# Patient Record
Sex: MALE | Race: WHITE | NOT HISPANIC OR LATINO
[De-identification: names, ages, dates, MRNs, and addresses within clinical notes are randomized per-mention and may not be internally consistent; named-entity substitution may affect disease eponyms.]

---

## 2017-09-26 ENCOUNTER — RX RENEWAL (OUTPATIENT)
Age: 52
End: 2017-09-26

## 2017-09-26 PROBLEM — Z00.00 ENCOUNTER FOR PREVENTIVE HEALTH EXAMINATION: Status: ACTIVE | Noted: 2017-09-26

## 2017-10-23 ENCOUNTER — RX RENEWAL (OUTPATIENT)
Age: 52
End: 2017-10-23

## 2017-11-03 ENCOUNTER — RX RENEWAL (OUTPATIENT)
Age: 52
End: 2017-11-03

## 2017-11-14 ENCOUNTER — RX RENEWAL (OUTPATIENT)
Age: 52
End: 2017-11-14

## 2017-11-15 ENCOUNTER — RX RENEWAL (OUTPATIENT)
Age: 52
End: 2017-11-15

## 2017-11-20 ENCOUNTER — RX RENEWAL (OUTPATIENT)
Age: 52
End: 2017-11-20

## 2017-12-16 ENCOUNTER — RX RENEWAL (OUTPATIENT)
Age: 52
End: 2017-12-16

## 2018-01-22 ENCOUNTER — RX RENEWAL (OUTPATIENT)
Age: 53
End: 2018-01-22

## 2018-01-23 ENCOUNTER — RX RENEWAL (OUTPATIENT)
Age: 53
End: 2018-01-23

## 2018-01-23 RX ORDER — INSULIN GLARGINE 100 [IU]/ML
100 INJECTION, SOLUTION SUBCUTANEOUS DAILY
Qty: 1 | Refills: 1 | Status: DISCONTINUED | COMMUNITY
Start: 2018-01-23 | End: 2018-01-23

## 2018-04-16 ENCOUNTER — RX RENEWAL (OUTPATIENT)
Age: 53
End: 2018-04-16

## 2018-07-31 ENCOUNTER — RX RENEWAL (OUTPATIENT)
Age: 53
End: 2018-07-31

## 2018-08-01 ENCOUNTER — RX RENEWAL (OUTPATIENT)
Age: 53
End: 2018-08-01

## 2018-08-01 RX ORDER — BENAZEPRIL HYDROCHLORIDE 40 MG/1
40 TABLET, FILM COATED ORAL DAILY
Qty: 90 | Refills: 0 | Status: DISCONTINUED | COMMUNITY
Start: 2017-09-26 | End: 2018-08-01

## 2018-08-07 ENCOUNTER — RX RENEWAL (OUTPATIENT)
Age: 53
End: 2018-08-07

## 2018-08-07 ENCOUNTER — MEDICATION RENEWAL (OUTPATIENT)
Age: 53
End: 2018-08-07

## 2018-08-07 ENCOUNTER — APPOINTMENT (OUTPATIENT)
Dept: FAMILY MEDICINE | Facility: CLINIC | Age: 53
End: 2018-08-07

## 2018-08-07 RX ORDER — LISINOPRIL 40 MG/1
40 TABLET ORAL DAILY
Qty: 90 | Refills: 0 | Status: DISCONTINUED | COMMUNITY
Start: 2018-08-01 | End: 2018-08-07

## 2018-08-14 ENCOUNTER — APPOINTMENT (OUTPATIENT)
Dept: FAMILY MEDICINE | Facility: CLINIC | Age: 53
End: 2018-08-14
Payer: MEDICAID

## 2018-08-14 VITALS
SYSTOLIC BLOOD PRESSURE: 140 MMHG | OXYGEN SATURATION: 98 % | DIASTOLIC BLOOD PRESSURE: 80 MMHG | BODY MASS INDEX: 34.91 KG/M2 | HEIGHT: 74 IN | RESPIRATION RATE: 12 BRPM | WEIGHT: 272 LBS | HEART RATE: 77 BPM

## 2018-08-14 DIAGNOSIS — K21.9 GASTRO-ESOPHAGEAL REFLUX DISEASE W/OUT ESOPHAGITIS: ICD-10-CM

## 2018-08-14 PROCEDURE — 99213 OFFICE O/P EST LOW 20 MIN: CPT

## 2018-08-14 RX ORDER — CITALOPRAM HYDROBROMIDE 20 MG/1
20 TABLET, FILM COATED ORAL
Qty: 30 | Refills: 6 | Status: DISCONTINUED | COMMUNITY
Start: 2017-11-14 | End: 2018-08-14

## 2018-08-14 NOTE — ASSESSMENT
[FreeTextEntry1] : 1. HTN- I renewed his medications. Diet/exercise reviewed and stressed to lose wt.  2. GERD- reviewed diet and use of  omeprazole.  As seen by endocrine q 3 months - he should be seen here q 6 months.

## 2018-08-14 NOTE — HISTORY OF PRESENT ILLNESS
[FreeTextEntry1] : pt presents for med check [de-identified] : Pt here for management of HTN and GERD  Seen by Endocrine - [facility].

## 2018-08-14 NOTE — REVIEW OF SYSTEMS
[Joint Pain] : joint pain [Joint Stiffness] : joint stiffness [Anxiety] : anxiety [Depression] : depression [Negative] : Heme/Lymph

## 2018-08-14 NOTE — PHYSICAL EXAM
[No Acute Distress] : no acute distress [Well-Appearing] : well-appearing [No JVD] : no jugular venous distention [Supple] : supple [No Lymphadenopathy] : no lymphadenopathy [No Respiratory Distress] : no respiratory distress  [Clear to Auscultation] : lungs were clear to auscultation bilaterally [Normal Rate] : normal rate  [Regular Rhythm] : with a regular rhythm [No Murmur] : no murmur heard [No Carotid Bruits] : no carotid bruits [Normal Posterior Cervical Nodes] : no posterior cervical lymphadenopathy [Normal Anterior Cervical Nodes] : no anterior cervical lymphadenopathy [Speech Grossly Normal] : speech grossly normal [Memory Grossly Normal] : memory grossly normal [Normal Affect] : the affect was normal [Alert and Oriented x3] : oriented to person, place, and time [Normal Mood] : the mood was normal [Normal Insight/Judgement] : insight and judgment were intact [de-identified] : overweight

## 2018-10-01 ENCOUNTER — RX RENEWAL (OUTPATIENT)
Age: 53
End: 2018-10-01

## 2018-10-02 ENCOUNTER — RX RENEWAL (OUTPATIENT)
Age: 53
End: 2018-10-02

## 2018-10-12 ENCOUNTER — RX RENEWAL (OUTPATIENT)
Age: 53
End: 2018-10-12

## 2018-11-12 ENCOUNTER — APPOINTMENT (OUTPATIENT)
Dept: FAMILY MEDICINE | Facility: CLINIC | Age: 53
End: 2018-11-12
Payer: MEDICAID

## 2018-11-12 VITALS
DIASTOLIC BLOOD PRESSURE: 88 MMHG | BODY MASS INDEX: 34.14 KG/M2 | SYSTOLIC BLOOD PRESSURE: 140 MMHG | HEART RATE: 70 BPM | RESPIRATION RATE: 14 BRPM | WEIGHT: 266 LBS | HEIGHT: 74 IN | OXYGEN SATURATION: 98 %

## 2018-11-12 PROCEDURE — 99213 OFFICE O/P EST LOW 20 MIN: CPT

## 2018-11-12 NOTE — REVIEW OF SYSTEMS
[Vision Problems] : no vision problems [Postnasal Drip] : postnasal drip [Nasal Discharge] : nasal discharge [Negative] : Heme/Lymph [FreeTextEntry4] : left facial pain

## 2018-11-12 NOTE — PHYSICAL EXAM
[Normal Outer Ear/Nose] : the outer ears and nose were normal in appearance [Normal Oropharynx] : the oropharynx was normal [Normal TMs] : both tympanic membranes were normal [No JVD] : no jugular venous distention [Supple] : supple [No Lymphadenopathy] : no lymphadenopathy [No Respiratory Distress] : no respiratory distress  [Clear to Auscultation] : lungs were clear to auscultation bilaterally [Normal Rate] : normal rate  [Regular Rhythm] : with a regular rhythm [No Murmur] : no murmur heard [Normal Posterior Cervical Nodes] : no posterior cervical lymphadenopathy [Normal Anterior Cervical Nodes] : no anterior cervical lymphadenopathy [de-identified] : in distress with pain, obese [de-identified] : very tender in left frontal area, PND

## 2018-11-12 NOTE — ASSESSMENT
[FreeTextEntry1] : I reviewed the CT of the brain result from [facility]- Does have sinus changes in left maxillary and B/L ethmoid sinuses. Very tender with percussion over left frontal. For treatment of sinus infection - rx cefuroxime 500 mg 1 bid pc, use plain mucinex and drink a lot of water. Can use tylenol 500 mg 1-2 q 6 h prn.

## 2018-11-12 NOTE — HISTORY OF PRESENT ILLNESS
[FreeTextEntry1] : Patient presents for ER follow-up\par \par From [facility]\par  [de-identified] : Pt having pain about left eye/lower left forehead. Sharp pain, present about 1 week. Has never had pain like this before. He was seen at [facility] yesterday. No visual changes, was prescribed fioricet - doesn't do anything

## 2018-11-19 ENCOUNTER — APPOINTMENT (OUTPATIENT)
Dept: FAMILY MEDICINE | Facility: CLINIC | Age: 53
End: 2018-11-19
Payer: MEDICAID

## 2018-11-19 VITALS
HEART RATE: 72 BPM | OXYGEN SATURATION: 97 % | SYSTOLIC BLOOD PRESSURE: 116 MMHG | DIASTOLIC BLOOD PRESSURE: 80 MMHG | RESPIRATION RATE: 12 BRPM

## 2018-11-19 DIAGNOSIS — Z87.09 PERSONAL HISTORY OF OTHER DISEASES OF THE RESPIRATORY SYSTEM: ICD-10-CM

## 2018-11-19 PROCEDURE — 99213 OFFICE O/P EST LOW 20 MIN: CPT

## 2018-11-19 NOTE — HISTORY OF PRESENT ILLNESS
[FreeTextEntry8] : pt presents for re check \par pt c/o ha +sinus pressure +vomit  x since last visit

## 2018-11-19 NOTE — REVIEW OF SYSTEMS
[Postnasal Drip] : postnasal drip [Nasal Discharge] : nasal discharge [Negative] : Heme/Lymph [FreeTextEntry4] : left frontal pressure/pain

## 2018-11-19 NOTE — PHYSICAL EXAM
[No Acute Distress] : no acute distress [Ill-Appearing] : ill-appearing [Normal Outer Ear/Nose] : the outer ears and nose were normal in appearance [Normal Oropharynx] : the oropharynx was normal [Normal TMs] : both tympanic membranes were normal [No JVD] : no jugular venous distention [Supple] : supple [No Lymphadenopathy] : no lymphadenopathy [No Respiratory Distress] : no respiratory distress  [Clear to Auscultation] : lungs were clear to auscultation bilaterally [Normal Rate] : normal rate  [Regular Rhythm] : with a regular rhythm [No Murmur] : no murmur heard [No Carotid Bruits] : no carotid bruits [Normal Posterior Cervical Nodes] : no posterior cervical lymphadenopathy [Normal Anterior Cervical Nodes] : no anterior cervical lymphadenopathy [de-identified] : overweight [de-identified] : left frontal tenderness

## 2018-11-19 NOTE — ASSESSMENT
[FreeTextEntry1] : Sinusitis not improving on cefuroxime. He is on citalopram and quinalones would not be a good choice.  Having some nausea and he has had issues with augmnentin and biaxin in the past.  Rx for suprax 400 mg 1 /day.  BRAT diet reviewed and he has zofran at home.

## 2019-03-14 ENCOUNTER — APPOINTMENT (OUTPATIENT)
Dept: FAMILY MEDICINE | Facility: CLINIC | Age: 54
End: 2019-03-14

## 2019-03-19 ENCOUNTER — RX RENEWAL (OUTPATIENT)
Age: 54
End: 2019-03-19

## 2019-04-24 ENCOUNTER — RX RENEWAL (OUTPATIENT)
Age: 54
End: 2019-04-24

## 2019-06-10 ENCOUNTER — RX RENEWAL (OUTPATIENT)
Age: 54
End: 2019-06-10

## 2019-07-18 ENCOUNTER — RX RENEWAL (OUTPATIENT)
Age: 54
End: 2019-07-18

## 2019-08-07 ENCOUNTER — RX RENEWAL (OUTPATIENT)
Age: 54
End: 2019-08-07

## 2019-08-08 ENCOUNTER — RX RENEWAL (OUTPATIENT)
Age: 54
End: 2019-08-08

## 2019-11-20 ENCOUNTER — RX RENEWAL (OUTPATIENT)
Age: 54
End: 2019-11-20

## 2020-01-28 ENCOUNTER — RX RENEWAL (OUTPATIENT)
Age: 55
End: 2020-01-28

## 2020-03-03 ENCOUNTER — RX RENEWAL (OUTPATIENT)
Age: 55
End: 2020-03-03

## 2020-03-17 ENCOUNTER — APPOINTMENT (OUTPATIENT)
Dept: FAMILY MEDICINE | Facility: CLINIC | Age: 55
End: 2020-03-17
Payer: COMMERCIAL

## 2020-03-17 VITALS
OXYGEN SATURATION: 97 % | RESPIRATION RATE: 14 BRPM | SYSTOLIC BLOOD PRESSURE: 136 MMHG | HEIGHT: 74 IN | HEART RATE: 60 BPM | WEIGHT: 252 LBS | DIASTOLIC BLOOD PRESSURE: 76 MMHG | BODY MASS INDEX: 32.34 KG/M2 | TEMPERATURE: 98.3 F

## 2020-03-17 LAB — ESTIMATED AVERAGE GLUCOSE: 11.5

## 2020-03-17 PROCEDURE — 99214 OFFICE O/P EST MOD 30 MIN: CPT | Mod: 25

## 2020-03-17 PROCEDURE — 90674 CCIIV4 VAC NO PRSV 0.5 ML IM: CPT

## 2020-03-17 PROCEDURE — 90732 PPSV23 VACC 2 YRS+ SUBQ/IM: CPT

## 2020-03-17 PROCEDURE — 90472 IMMUNIZATION ADMIN EACH ADD: CPT

## 2020-03-17 PROCEDURE — G0009: CPT

## 2020-03-17 RX ORDER — INSULIN LISPRO 100 [IU]/ML
100 INJECTION, SOLUTION INTRAVENOUS; SUBCUTANEOUS
Refills: 0 | Status: DISCONTINUED | COMMUNITY
End: 2020-03-17

## 2020-03-17 RX ORDER — CEFUROXIME AXETIL 500 MG/1
500 TABLET ORAL
Qty: 20 | Refills: 0 | Status: DISCONTINUED | COMMUNITY
Start: 2018-11-12 | End: 2020-03-17

## 2020-03-17 RX ORDER — INSULIN GLARGINE 100 [IU]/ML
100 INJECTION, SOLUTION SUBCUTANEOUS DAILY
Qty: 1 | Refills: 2 | Status: DISCONTINUED | COMMUNITY
Start: 2018-01-23 | End: 2020-03-17

## 2020-03-17 RX ORDER — CEFIXIME 400 MG/1
400 CAPSULE ORAL
Qty: 10 | Refills: 0 | Status: DISCONTINUED | COMMUNITY
Start: 2018-11-19 | End: 2020-03-17

## 2020-03-17 RX ORDER — METFORMIN HYDROCHLORIDE 500 MG/1
500 TABLET, COATED ORAL
Qty: 180 | Refills: 1 | Status: DISCONTINUED | COMMUNITY
End: 2020-03-17

## 2020-03-17 NOTE — HEALTH RISK ASSESSMENT
[Patient reported colonoscopy was normal] : Patient reported colonoscopy was normal [HIV test declined] : HIV test declined [Hepatitis C test declined] : Hepatitis C test declined [ColonoscopyDate] : 2012 [ColonoscopyComments] : told to repeat in 5yrs Dr Fernando jenkins [facility]

## 2020-03-17 NOTE — PHYSICAL EXAM
[No Lymphadenopathy] : no lymphadenopathy [Supple] : supple [No Edema] : there was no peripheral edema [Normal Affect] : the affect was normal [Normal Mood] : the mood was normal [Normal Insight/Judgement] : insight and judgment were intact [None] : no ulcers in either foot were found [] : right foot [___] : left foot points [unfilled] [Normal] : soft, non-tender, non-distended, no masses palpated, no HSM and normal bowel sounds [de-identified] : Full ROM of back b/l, mild discomfort in right paraspinal lumbar region w/ flexion, extension and  lateral rotation, no pain on palpatio of spine, +pain on palpation of lumbar paraspinal region, no edema or erythema or warmth or weakness noted  [de-identified] : +straight leg raise right leg [de-identified] : thickening and yellowing of toe nails b/l feet \par dry skin noted  [TWNoteComboBox3] : +2 [TWNoteComboBox4] : +2

## 2020-03-17 NOTE — REVIEW OF SYSTEMS
[Back Pain] : back pain [Negative] : Cardiovascular [Fever] : no fever [Chills] : no chills [Vision Problems] : no vision problems [Headache] : no headache

## 2020-03-17 NOTE — PLAN
[FreeTextEntry1] : \par HTN \par recheck 3-4 weeks\par avoid salt\par avoid nsaids\par refilled meds\par renal panel being checked by endo advised to have labs sent to office\par \par due for colonoscopy, referral given\par \par flu vaccine given right arm IM\par no adverse reactions noted, consent obtained\par \par pneumovax 23 vaccine given left arm IM\par no adverse reactions noted, consent obtained\par \par \par DM II\par retinal exam was done 6mos ago will obtain result\par seen by endocrine yesterday and a1c beingchecked at lab\par advised to see podiatrist\par advised to check feet nightly\par moisturizing cream advised to feet \par \par anxiety/depression controlled\par Denies suicidal or homicidal ideations\par refilled meds\par \par \par f/u 3-4 weeks for bp check pt agreed w/plan and will follow up\par advised to stay inside as he has Diabetes and is at risk of getting complications with coronavirus if he contracts thevirus\par he agreed and is working from home\par

## 2020-03-17 NOTE — HISTORY OF PRESENT ILLNESS
[FreeTextEntry1] : patient presents for medication renewal\par  [de-identified] : 55yo male presents for medication renewal\par he is feeling well overall except for low back pain and leg pain at times \par \par he went to [facility] on [address] [place] for diabetic retinal exam and it was normal\par he hasn’t seen a podiatrist\par \par he goes to [facility] and was given blood work script  yesterday\par \par yesterday his bp at endocrine was 117/70\par \par he said if he is driving or laying down he has pain shooting down his right and sometimes left leg, burning and tightening\par he said he had cramps in his ankle last night\par he is having potassium checked by endocrinologist \par he started a new job and he is on his feet 8-9hrs perday\par he has lower back pain also \par \par denie swelling in legs, warmth or redness \par \par c/o burning, tightening leg/ back   pain,   7 /10 intermittent alleviated by advil  , aggravated by   laying down or sitting for too long \par \par he is taking advil 3x per day\par \par denies saddle paresthesia or loss of control of bowels or bladder \par \par anxiety/depression controlled\par Denies suicidal or homicidal ideations

## 2020-04-28 ENCOUNTER — APPOINTMENT (OUTPATIENT)
Dept: FAMILY MEDICINE | Facility: CLINIC | Age: 55
End: 2020-04-28

## 2020-08-15 ENCOUNTER — RX RENEWAL (OUTPATIENT)
Age: 55
End: 2020-08-15

## 2020-09-04 ENCOUNTER — APPOINTMENT (OUTPATIENT)
Dept: FAMILY MEDICINE | Facility: CLINIC | Age: 55
End: 2020-09-04
Payer: COMMERCIAL

## 2020-09-04 VITALS
HEIGHT: 74 IN | WEIGHT: 248 LBS | OXYGEN SATURATION: 96 % | BODY MASS INDEX: 31.83 KG/M2 | HEART RATE: 97 BPM | DIASTOLIC BLOOD PRESSURE: 78 MMHG | RESPIRATION RATE: 14 BRPM | SYSTOLIC BLOOD PRESSURE: 136 MMHG

## 2020-09-04 VITALS — TEMPERATURE: 95.6 F

## 2020-09-04 DIAGNOSIS — R22.1 LOCALIZED SWELLING, MASS AND LUMP, NECK: ICD-10-CM

## 2020-09-04 PROCEDURE — 99214 OFFICE O/P EST MOD 30 MIN: CPT

## 2020-09-06 ENCOUNTER — RX RENEWAL (OUTPATIENT)
Age: 55
End: 2020-09-06

## 2020-09-08 NOTE — PLAN
[FreeTextEntry1] : \par \par preop\par labsreceived and wnl \par ekg received \par -EKG- NSR @78   BPM, NORMAL AXIS, NORMAL RI/QT INTERVAL, NO ST/ T WAVE ABNORMALITIES NOTED\par EKG reviewed\par no nsaids or asa 7 days prior to surgery\par \par Enlarged neck\par -US neck doesn’t need this prior to surgery\par -Tsh with reflex free t4 if abnormal US\par \par pt agreed w/plan \par advised if patient doesn’t hear from me 1 week after testing to call office for results , patient agreed w/plan and understood.\par \par \par PT OPTIMIZED FOR SURGERY

## 2020-09-08 NOTE — PHYSICAL EXAM
[No Lymphadenopathy] : no lymphadenopathy [Supple] : supple [No CVA Tenderness] : no CVA  tenderness [No Edema] : there was no peripheral edema [Normal] : soft, non-tender, non-distended, no masses palpated, no HSM and normal bowel sounds [Normal Affect] : the affect was normal [No Focal Deficits] : no focal deficits [Normal Mood] : the mood was normal [Normal Insight/Judgement] : insight and judgment were intact [de-identified] : thyroid feels enlarged [de-identified] : CN 2-12 INTACT, NORMAL STRENGTH UPPER AND LOWER EXT B/L 5/5, NORMAL RAPID ALTERNATING MOVEMENTS AND FINGER TO NOSE

## 2020-09-08 NOTE — HISTORY OF PRESENT ILLNESS
[No Pertinent Cardiac History] : no history of aortic stenosis, atrial fibrillation, coronary artery disease, recent myocardial infarction, or implantable device/pacemaker [No Adverse Anesthesia Reaction] : no adverse anesthesia reaction in self or family member [No Pertinent Pulmonary History] : no history of asthma, COPD, sleep apnea, or smoking [Diabetes] : diabetes [(Patient denies any chest pain, claudication, dyspnea on exertion, orthopnea, palpitations or syncope)] : Patient denies any chest pain, claudication, dyspnea on exertion, orthopnea, palpitations or syncope [Moderate (4-6 METs)] : Moderate (4-6 METs) [Family Member] : no family member with adverse anesthesia reaction/sudden death [Self] : no previous adverse anesthesia reaction [FreeTextEntry1] : right hip surgery [Chronic Anticoagulation] : no chronic anticoagulation [Chronic Kidney Disease] : no chronic kidney disease [FreeTextEntry4] : Patient presents for medical clearance for right hip replacement \par  [FreeTextEntry2] : 9/9/2020 [FreeTextEntry3] : Dr. River

## 2020-09-08 NOTE — REVIEW OF SYSTEMS
[Negative] : Heme/Lymph [Dysuria] : no dysuria [Frequency] : no frequency [Hematuria] : no hematuria [Skin Rash] : no skin rash

## 2020-09-22 ENCOUNTER — RX RENEWAL (OUTPATIENT)
Age: 55
End: 2020-09-22

## 2020-09-27 ENCOUNTER — RX RENEWAL (OUTPATIENT)
Age: 55
End: 2020-09-27

## 2020-10-17 ENCOUNTER — RX RENEWAL (OUTPATIENT)
Age: 55
End: 2020-10-17

## 2020-12-05 ENCOUNTER — RX RENEWAL (OUTPATIENT)
Age: 55
End: 2020-12-05

## 2020-12-16 PROBLEM — Z87.09 HISTORY OF ACUTE SINUSITIS: Status: RESOLVED | Noted: 2018-11-12 | Resolved: 2020-12-16

## 2021-01-20 ENCOUNTER — TRANSCRIPTION ENCOUNTER (OUTPATIENT)
Age: 56
End: 2021-01-20

## 2021-01-21 ENCOUNTER — APPOINTMENT (OUTPATIENT)
Dept: FAMILY MEDICINE | Facility: CLINIC | Age: 56
End: 2021-01-21

## 2021-01-21 ENCOUNTER — RX RENEWAL (OUTPATIENT)
Age: 56
End: 2021-01-21

## 2021-01-22 ENCOUNTER — RX RENEWAL (OUTPATIENT)
Age: 56
End: 2021-01-22

## 2021-01-24 ENCOUNTER — RX RENEWAL (OUTPATIENT)
Age: 56
End: 2021-01-24

## 2021-02-11 ENCOUNTER — RX RENEWAL (OUTPATIENT)
Age: 56
End: 2021-02-11

## 2021-02-17 ENCOUNTER — RX RENEWAL (OUTPATIENT)
Age: 56
End: 2021-02-17

## 2021-03-05 ENCOUNTER — APPOINTMENT (OUTPATIENT)
Dept: FAMILY MEDICINE | Facility: CLINIC | Age: 56
End: 2021-03-05

## 2021-03-29 ENCOUNTER — APPOINTMENT (OUTPATIENT)
Dept: FAMILY MEDICINE | Facility: CLINIC | Age: 56
End: 2021-03-29

## 2021-04-02 ENCOUNTER — APPOINTMENT (OUTPATIENT)
Dept: FAMILY MEDICINE | Facility: CLINIC | Age: 56
End: 2021-04-02
Payer: COMMERCIAL

## 2021-04-02 VITALS
SYSTOLIC BLOOD PRESSURE: 152 MMHG | OXYGEN SATURATION: 98 % | TEMPERATURE: 97.2 F | RESPIRATION RATE: 14 BRPM | DIASTOLIC BLOOD PRESSURE: 80 MMHG | HEIGHT: 74 IN | HEART RATE: 66 BPM | BODY MASS INDEX: 31.83 KG/M2 | WEIGHT: 248 LBS

## 2021-04-02 VITALS — DIASTOLIC BLOOD PRESSURE: 80 MMHG | SYSTOLIC BLOOD PRESSURE: 150 MMHG

## 2021-04-02 DIAGNOSIS — Z12.11 ENCOUNTER FOR SCREENING FOR MALIGNANT NEOPLASM OF COLON: ICD-10-CM

## 2021-04-02 DIAGNOSIS — L60.8 OTHER NAIL DISORDERS: ICD-10-CM

## 2021-04-02 DIAGNOSIS — Z92.29 PERSONAL HISTORY OF OTHER DRUG THERAPY: ICD-10-CM

## 2021-04-02 LAB — HBA1C MFR BLD HPLC: 7.8

## 2021-04-02 PROCEDURE — 99072 ADDL SUPL MATRL&STAF TM PHE: CPT

## 2021-04-02 PROCEDURE — 99214 OFFICE O/P EST MOD 30 MIN: CPT

## 2021-04-02 RX ORDER — DAPAGLIFLOZIN AND METFORMIN HYDROCHLORIDE 5; 1000 MG/1; MG/1
5-1000 TABLET, FILM COATED, EXTENDED RELEASE ORAL DAILY
Refills: 0 | Status: DISCONTINUED | COMMUNITY
Start: 2020-03-17 | End: 2021-04-02

## 2021-04-02 NOTE — REVIEW OF SYSTEMS
[Chest Pain] : chest pain [Back Pain] : back pain [Skin Rash] : no skin rash [Negative] : Heme/Lymph [de-identified] : discolored toenail

## 2021-04-02 NOTE — HISTORY OF PRESENT ILLNESS
[FreeTextEntry1] : patient presents for hospital follow up  [de-identified] : Two nights ago he started sweating and had pain in his chest and back.  The pain resolved but woke him up yesterday morning and he was seen at the ER.  Lab testing was negative for a heart attack and the EKG was normal.  Xrays were negative.  The pain is constant and is stronger.  The pain is like a stabbing and burning in the left side of his chest and the left mid back.  He took motrin and applied ice and heat without improvement. He had chicken pox when he was 29 years old.  He also has a discolored left big toenail

## 2021-04-02 NOTE — PLAN
[FreeTextEntry1] : although he hasn't developed a rash yet his symptoms are very consistent with zoster, the CBC, CMP, troponin and EKG were reviewed via [facility], he will take gabapentin and valtrex as directed, he was advised to call me in the next few days with follow up  and to see a podiatrist for the toenail discoloration and regularly due to diabetes

## 2021-04-02 NOTE — PHYSICAL EXAM
[No Acute Distress] : no acute distress [Well Nourished] : well nourished [Well Developed] : well developed [Well-Appearing] : well-appearing [Normal Voice/Communication] : normal voice/communication [Supple] : supple [No Respiratory Distress] : no respiratory distress  [No Accessory Muscle Use] : no accessory muscle use [Clear to Auscultation] : lungs were clear to auscultation bilaterally [Normal Rate] : normal rate  [Regular Rhythm] : with a regular rhythm [Normal S1, S2] : normal S1 and S2 [No Murmur] : no murmur heard [No Rash] : no rash [Normal Mood] : the mood was normal [de-identified] : black discoloration of left 1st toenail

## 2021-04-05 ENCOUNTER — NON-APPOINTMENT (OUTPATIENT)
Age: 56
End: 2021-04-05

## 2021-04-05 RX ORDER — GABAPENTIN 300 MG/1
300 CAPSULE ORAL
Qty: 33 | Refills: 0 | Status: DISCONTINUED | COMMUNITY
Start: 2021-04-02 | End: 2021-04-05

## 2021-04-08 ENCOUNTER — RX RENEWAL (OUTPATIENT)
Age: 56
End: 2021-04-08

## 2021-04-16 ENCOUNTER — RX RENEWAL (OUTPATIENT)
Age: 56
End: 2021-04-16

## 2021-04-26 ENCOUNTER — RX RENEWAL (OUTPATIENT)
Age: 56
End: 2021-04-26

## 2021-05-18 ENCOUNTER — RX RENEWAL (OUTPATIENT)
Age: 56
End: 2021-05-18

## 2021-05-26 ENCOUNTER — APPOINTMENT (OUTPATIENT)
Dept: NEUROLOGY | Facility: CLINIC | Age: 56
End: 2021-05-26

## 2021-06-19 ENCOUNTER — RX RENEWAL (OUTPATIENT)
Age: 56
End: 2021-06-19

## 2021-07-09 ENCOUNTER — RX CHANGE (OUTPATIENT)
Age: 56
End: 2021-07-09

## 2021-07-14 ENCOUNTER — RX CHANGE (OUTPATIENT)
Age: 56
End: 2021-07-14

## 2021-08-09 ENCOUNTER — NON-APPOINTMENT (OUTPATIENT)
Age: 56
End: 2021-08-09

## 2021-10-09 ENCOUNTER — RX RENEWAL (OUTPATIENT)
Age: 56
End: 2021-10-09

## 2021-10-12 ENCOUNTER — APPOINTMENT (OUTPATIENT)
Dept: FAMILY MEDICINE | Facility: CLINIC | Age: 56
End: 2021-10-12
Payer: COMMERCIAL

## 2021-10-12 VITALS
TEMPERATURE: 97.6 F | SYSTOLIC BLOOD PRESSURE: 122 MMHG | RESPIRATION RATE: 14 BRPM | BODY MASS INDEX: 30.93 KG/M2 | HEIGHT: 74 IN | DIASTOLIC BLOOD PRESSURE: 82 MMHG | WEIGHT: 241 LBS | HEART RATE: 80 BPM | OXYGEN SATURATION: 98 %

## 2021-10-12 DIAGNOSIS — Z23 ENCOUNTER FOR IMMUNIZATION: ICD-10-CM

## 2021-10-12 DIAGNOSIS — B02.9 ZOSTER W/OUT COMPLICATIONS: ICD-10-CM

## 2021-10-12 DIAGNOSIS — M79.2 NEURALGIA AND NEURITIS, UNSPECIFIED: ICD-10-CM

## 2021-10-12 LAB — HBA1C MFR BLD HPLC: 7.3

## 2021-10-12 PROCEDURE — 99214 OFFICE O/P EST MOD 30 MIN: CPT

## 2021-10-12 RX ORDER — GABAPENTIN 400 MG/1
400 CAPSULE ORAL EVERY 6 HOURS
Qty: 40 | Refills: 0 | Status: DISCONTINUED | COMMUNITY
Start: 2021-04-05 | End: 2021-10-12

## 2021-10-12 RX ORDER — VALACYCLOVIR 1 G/1
1 TABLET, FILM COATED ORAL
Qty: 21 | Refills: 0 | Status: DISCONTINUED | COMMUNITY
Start: 2021-04-02 | End: 2021-10-12

## 2021-10-12 NOTE — PHYSICAL EXAM
[No Acute Distress] : no acute distress [Well Nourished] : well nourished [Well Developed] : well developed [Well-Appearing] : well-appearing [Normal Voice/Communication] : normal voice/communication [No Respiratory Distress] : no respiratory distress  [No Accessory Muscle Use] : no accessory muscle use [Clear to Auscultation] : lungs were clear to auscultation bilaterally [Normal Rate] : normal rate  [Regular Rhythm] : with a regular rhythm [Normal S1, S2] : normal S1 and S2 [Soft] : abdomen soft [Non Tender] : non-tender [Non-distended] : non-distended [Normal Bowel Sounds] : normal bowel sounds [Coordination Grossly Intact] : coordination grossly intact [Normal Mood] : the mood was normal

## 2021-10-12 NOTE — HISTORY OF PRESENT ILLNESS
[FreeTextEntry8] : Patient presents for heart burn/pain, diarrhea x 1 week.  About a week ago he started with a pressure in his upper abdomen with heartburn and now after eating anything he has watery stool.  He hasn't had a fever, vomiting but does feel nauseous.  The stool is light brown in color.  He hasn't been on any antibiotic tx.  He hasn't taken any OTC tx and is taking the omeprazole daily.  He has difficulty swallowing large pills.  He had lab testing at [facility] about a month ago.

## 2021-10-12 NOTE — PLAN
[FreeTextEntry1] : he was advised to stop eating dairy products, to continue omeprazole and to take sucralfate before each meal and at HS, GI consultation was advised, abdominal ultrasound ordered, amylase and lipase ordered and will be drawn in the offide and sent to [facility] for analysis, ophthalmology referral for diabetic eye exam ordered and we will obtain a copy of his latest lab report from endo

## 2021-10-12 NOTE — REVIEW OF SYSTEMS
[Abdominal Pain] : abdominal pain [Nausea] : nausea [Constipation] : no constipation [Diarrhea] : diarrhea [Vomiting] : no vomiting [Heartburn] : heartburn [Melena] : no melena [Negative] : Heme/Lymph

## 2021-10-13 LAB
AMYLASE/CREAT SERPL: 49 U/L
LPL SERPL-CCNC: 19 U/L

## 2021-10-14 ENCOUNTER — APPOINTMENT (OUTPATIENT)
Dept: FAMILY MEDICINE | Facility: CLINIC | Age: 56
End: 2021-10-14

## 2021-10-18 ENCOUNTER — APPOINTMENT (OUTPATIENT)
Dept: ULTRASOUND IMAGING | Facility: CLINIC | Age: 56
End: 2021-10-18
Payer: COMMERCIAL

## 2021-10-18 ENCOUNTER — OUTPATIENT (OUTPATIENT)
Dept: OUTPATIENT SERVICES | Facility: HOSPITAL | Age: 56
LOS: 1 days | End: 2021-10-18
Payer: COMMERCIAL

## 2021-10-18 DIAGNOSIS — R10.13 EPIGASTRIC PAIN: ICD-10-CM

## 2021-10-18 PROCEDURE — 76700 US EXAM ABDOM COMPLETE: CPT

## 2021-10-18 PROCEDURE — 76700 US EXAM ABDOM COMPLETE: CPT | Mod: 26

## 2021-10-29 ENCOUNTER — NON-APPOINTMENT (OUTPATIENT)
Age: 56
End: 2021-10-29

## 2021-10-31 ENCOUNTER — RX RENEWAL (OUTPATIENT)
Age: 56
End: 2021-10-31

## 2021-11-15 ENCOUNTER — APPOINTMENT (OUTPATIENT)
Dept: OPHTHALMOLOGY | Facility: CLINIC | Age: 56
End: 2021-11-15

## 2021-12-07 ENCOUNTER — RX RENEWAL (OUTPATIENT)
Age: 56
End: 2021-12-07

## 2021-12-22 ENCOUNTER — RX RENEWAL (OUTPATIENT)
Age: 56
End: 2021-12-22

## 2022-02-17 DIAGNOSIS — K55.069 ACUTE INFARCTION OF INTESTINE, PART AND EXTENT UNSPECIFIED: ICD-10-CM

## 2022-02-27 ENCOUNTER — RX RENEWAL (OUTPATIENT)
Age: 57
End: 2022-02-27

## 2022-03-27 ENCOUNTER — RX RENEWAL (OUTPATIENT)
Age: 57
End: 2022-03-27

## 2022-04-04 DIAGNOSIS — R91.1 SOLITARY PULMONARY NODULE: ICD-10-CM

## 2022-04-11 ENCOUNTER — RX RENEWAL (OUTPATIENT)
Age: 57
End: 2022-04-11

## 2022-05-18 DIAGNOSIS — I48.0 PAROXYSMAL ATRIAL FIBRILLATION: ICD-10-CM

## 2022-05-18 RX ORDER — SUCRALFATE 1 G/1
1 TABLET ORAL
Qty: 120 | Refills: 11 | Status: DISCONTINUED | COMMUNITY
Start: 2021-10-12 | End: 2022-05-18

## 2022-05-18 RX ORDER — OMEPRAZOLE 40 MG/1
40 CAPSULE, DELAYED RELEASE ORAL
Qty: 90 | Refills: 1 | Status: DISCONTINUED | COMMUNITY
Start: 2017-12-16 | End: 2022-05-18

## 2022-05-18 RX ORDER — BENAZEPRIL HYDROCHLORIDE 40 MG/1
40 TABLET, FILM COATED ORAL
Qty: 90 | Refills: 0 | Status: DISCONTINUED | COMMUNITY
Start: 2018-08-07 | End: 2022-05-18

## 2022-06-10 ENCOUNTER — RX CHANGE (OUTPATIENT)
Age: 57
End: 2022-06-10

## 2022-06-21 ENCOUNTER — RX RENEWAL (OUTPATIENT)
Age: 57
End: 2022-06-21

## 2022-06-30 ENCOUNTER — RX RENEWAL (OUTPATIENT)
Age: 57
End: 2022-06-30

## 2022-07-05 ENCOUNTER — APPOINTMENT (OUTPATIENT)
Dept: FAMILY MEDICINE | Facility: CLINIC | Age: 57
End: 2022-07-05

## 2022-07-05 VITALS
HEART RATE: 82 BPM | WEIGHT: 211 LBS | SYSTOLIC BLOOD PRESSURE: 114 MMHG | RESPIRATION RATE: 14 BRPM | OXYGEN SATURATION: 97 % | HEIGHT: 74 IN | TEMPERATURE: 98.3 F | DIASTOLIC BLOOD PRESSURE: 60 MMHG | BODY MASS INDEX: 27.08 KG/M2

## 2022-07-05 DIAGNOSIS — R10.13 EPIGASTRIC PAIN: ICD-10-CM

## 2022-07-05 DIAGNOSIS — Z11.59 ENCOUNTER FOR SCREENING FOR OTHER VIRAL DISEASES: ICD-10-CM

## 2022-07-05 DIAGNOSIS — K22.89 OTHER SPECIFIED DISEASE OF ESOPHAGUS: ICD-10-CM

## 2022-07-05 PROCEDURE — 99214 OFFICE O/P EST MOD 30 MIN: CPT

## 2022-07-05 RX ORDER — OMEPRAZOLE 20 MG/1
20 CAPSULE, DELAYED RELEASE ORAL TWICE DAILY
Refills: 0 | Status: ACTIVE | COMMUNITY

## 2022-07-05 RX ORDER — CHLORHEXIDINE GLUCONATE 1.2 MG/ML
0.12 RINSE BUCCAL
Qty: 473 | Refills: 0 | Status: DISCONTINUED | COMMUNITY
Start: 2022-04-27

## 2022-07-05 RX ORDER — ENOXAPARIN SODIUM 100 MG/ML
100 INJECTION, SOLUTION SUBCUTANEOUS
Qty: 6 | Refills: 0 | Status: DISCONTINUED | COMMUNITY
Start: 2022-05-03

## 2022-07-05 RX ORDER — SCOPOLAMINE 1.5 MG/1
1 PATCH, EXTENDED RELEASE TRANSDERMAL
Qty: 2 | Refills: 0 | Status: DISCONTINUED | COMMUNITY
Start: 2022-05-05

## 2022-07-05 RX ORDER — OXYCODONE HYDROCHLORIDE 5 MG/5ML
5 SOLUTION ORAL
Qty: 160 | Refills: 0 | Status: DISCONTINUED | COMMUNITY
Start: 2022-05-20

## 2022-07-05 RX ORDER — RIVAROXABAN 15 MG-20MG
15 & 20 KIT ORAL
Qty: 51 | Refills: 0 | Status: DISCONTINUED | COMMUNITY
Start: 2022-02-14

## 2022-07-05 RX ORDER — AMIODARONE HYDROCHLORIDE 200 MG/1
200 TABLET ORAL
Qty: 30 | Refills: 0 | Status: DISCONTINUED | COMMUNITY
Start: 2022-04-27

## 2022-07-05 RX ORDER — FLASH GLUCOSE SENSOR
KIT MISCELLANEOUS
Qty: 2 | Refills: 0 | Status: ACTIVE | COMMUNITY
Start: 2021-12-07

## 2022-07-05 RX ORDER — GABAPENTIN 250 MG/5ML
250 SOLUTION ORAL
Refills: 0 | Status: DISCONTINUED | COMMUNITY
End: 2022-07-05

## 2022-07-05 RX ORDER — VANCOMYCIN HYDROCHLORIDE 125 MG/1
125 CAPSULE ORAL
Qty: 56 | Refills: 0 | Status: DISCONTINUED | COMMUNITY
Start: 2022-06-15

## 2022-07-05 RX ORDER — LANSOPRAZOLE
3 KIT
Refills: 0 | Status: DISCONTINUED | COMMUNITY
End: 2022-07-05

## 2022-07-05 RX ORDER — COMPOUND VEHICLE SUSP SF NO.24
SUSPENSION, ORAL (FINAL DOSE FORM) ORAL
Qty: 75 | Refills: 0 | Status: DISCONTINUED | COMMUNITY
Start: 2022-04-27

## 2022-07-05 RX ORDER — METOPROLOL TARTRATE 1 MG/ML
INJECTION, SOLUTION INTRAVENOUS
Refills: 0 | Status: DISCONTINUED | COMMUNITY
End: 2022-07-05

## 2022-07-05 RX ORDER — DOXAZOSIN 2 MG/1
2 TABLET ORAL
Qty: 3 | Refills: 0 | Status: DISCONTINUED | COMMUNITY
Start: 2022-03-30

## 2022-07-05 RX ORDER — LORAZEPAM 2 MG/ML
2 CONCENTRATE ORAL
Qty: 8 | Refills: 0 | Status: DISCONTINUED | COMMUNITY
Start: 2022-06-03

## 2022-07-05 RX ORDER — RIVAROXABAN 2.5 MG/1
TABLET, FILM COATED ORAL
Refills: 0 | Status: DISCONTINUED | COMMUNITY
End: 2022-07-05

## 2022-07-05 RX ORDER — CYCLOBENZAPRINE HYDROCHLORIDE 5 MG/1
5 TABLET, FILM COATED ORAL
Qty: 21 | Refills: 0 | Status: DISCONTINUED | COMMUNITY
Start: 2022-06-04

## 2022-07-05 RX ORDER — ENOXAPARIN SODIUM 100 MG/ML
100 INJECTION SUBCUTANEOUS
Refills: 0 | Status: DISCONTINUED | COMMUNITY
End: 2022-07-05

## 2022-07-05 RX ORDER — AMOXICILLIN AND CLAVULANATE POTASSIUM 400; 57 MG/5ML; MG/5ML
400-57 POWDER, FOR SUSPENSION ORAL
Qty: 200 | Refills: 0 | Status: DISCONTINUED | COMMUNITY
Start: 2022-04-27

## 2022-07-05 NOTE — PHYSICAL EXAM
[No Acute Distress] : no acute distress [Well Developed] : well developed [No Respiratory Distress] : no respiratory distress  [Coordination Grossly Intact] : coordination grossly intact [Normal Mood] : the mood was normal [de-identified] : pink, excoriated lesions in linear array on extremities

## 2022-07-05 NOTE — REVIEW OF SYSTEMS
[Recent Change In Weight] : ~T recent weight change [Heartburn] : heartburn [Itching] : itching [Skin Rash] : skin rash [Negative] : Heme/Lymph

## 2022-07-05 NOTE — PLAN
[FreeTextEntry1] : he was advised to apply the mometasone ointment as prescribed, he will continue current dose of citalopram, the recent cardiology notes, event monitor report and multiple reports from [facility] were all reviewed

## 2022-07-05 NOTE — HISTORY OF PRESENT ILLNESS
[FreeTextEntry1] : Patient presents for medication renewal [de-identified] : He had the esophagectomy and has been following up regularly with his providers at [facility].  He had A Fib perioperatively and follows up with cardiology.  He reports that the citalopram dose is working well for him.  He has an itchy rash on his arms and legs

## 2022-07-08 ENCOUNTER — RX RENEWAL (OUTPATIENT)
Age: 57
End: 2022-07-08

## 2022-07-09 ENCOUNTER — APPOINTMENT (OUTPATIENT)
Dept: FAMILY MEDICINE | Facility: CLINIC | Age: 57
End: 2022-07-09

## 2022-07-09 PROCEDURE — 99441: CPT

## 2022-07-09 NOTE — HISTORY OF PRESENT ILLNESS
[Home] : at home, [unfilled] , at the time of the visit. [Medical Office: ([facility])___] : at the medical office located in  [Verbal consent obtained from patient] : the patient, [unfilled] [FreeTextEntry8] : The patient telephoned and requested a call back to discuss their health.  The visit was performed over the telephone as the patient was unable to be seen in the office due to the COVID 19 pandemic.  He started to feel ill 2 days ago with headache, body aches, chills, fever, vomiting and a bad cough.  He was seen at [facility] yesterday and tested positive for Covid.  He is unable to hold down any food or fluids\par

## 2022-07-09 NOTE — PLAN
[FreeTextEntry1] : he was advised that unfortunately he isn't a good candidate for Paxlovid treatment due to the Xarelto use, he was advised to be seen at the ER for hydration and monoclonal Ab tx

## 2022-07-25 ENCOUNTER — RX RENEWAL (OUTPATIENT)
Age: 57
End: 2022-07-25

## 2022-09-16 ENCOUNTER — NON-APPOINTMENT (OUTPATIENT)
Age: 57
End: 2022-09-16

## 2022-09-20 ENCOUNTER — NON-APPOINTMENT (OUTPATIENT)
Age: 57
End: 2022-09-20

## 2022-09-28 ENCOUNTER — RX RENEWAL (OUTPATIENT)
Age: 57
End: 2022-09-28

## 2022-12-29 ENCOUNTER — RX RENEWAL (OUTPATIENT)
Age: 57
End: 2022-12-29

## 2023-01-07 ENCOUNTER — APPOINTMENT (OUTPATIENT)
Dept: FAMILY MEDICINE | Facility: CLINIC | Age: 58
End: 2023-01-07
Payer: COMMERCIAL

## 2023-01-07 VITALS — SYSTOLIC BLOOD PRESSURE: 122 MMHG | DIASTOLIC BLOOD PRESSURE: 80 MMHG

## 2023-01-07 VITALS
BODY MASS INDEX: 26.69 KG/M2 | DIASTOLIC BLOOD PRESSURE: 70 MMHG | RESPIRATION RATE: 12 BRPM | SYSTOLIC BLOOD PRESSURE: 120 MMHG | WEIGHT: 208 LBS | HEART RATE: 54 BPM | OXYGEN SATURATION: 98 % | HEIGHT: 74 IN

## 2023-01-07 VITALS — DIASTOLIC BLOOD PRESSURE: 72 MMHG | SYSTOLIC BLOOD PRESSURE: 130 MMHG

## 2023-01-07 VITALS — TEMPERATURE: 94.6 F

## 2023-01-07 DIAGNOSIS — L25.5 UNSPECIFIED CONTACT DERMATITIS DUE TO PLANTS, EXCEPT FOOD: ICD-10-CM

## 2023-01-07 DIAGNOSIS — U07.1 COVID-19: ICD-10-CM

## 2023-01-07 LAB
CREAT SPEC-SCNC: 116.6
HBA1C MFR BLD HPLC: 7.3
MICROALBUMIN 24H UR DL<=1MG/L-MCNC: NORMAL
MICROALBUMIN/CREATININE, URINE: NORMAL

## 2023-01-07 PROCEDURE — 90686 IIV4 VACC NO PRSV 0.5 ML IM: CPT

## 2023-01-07 PROCEDURE — G0008: CPT

## 2023-01-07 PROCEDURE — 99214 OFFICE O/P EST MOD 30 MIN: CPT | Mod: 25

## 2023-01-07 RX ORDER — RIVAROXABAN 20 MG/1
20 TABLET, FILM COATED ORAL
Refills: 0 | Status: DISCONTINUED | COMMUNITY
End: 2023-01-07

## 2023-01-07 RX ORDER — LOSARTAN POTASSIUM 100 MG/1
100 TABLET, FILM COATED ORAL
Qty: 90 | Refills: 3 | Status: DISCONTINUED | COMMUNITY
Start: 2022-09-20 | End: 2023-01-07

## 2023-01-07 NOTE — PLAN
[FreeTextEntry1] : the lab report from 1/5 was obtained and reviewed (CMP, lipids, A1C, urine for creatinine and albumin, TSH), I also reviewed the most recent cardiology, pulmonary and oncology notes, I advised him to decrease losartan to 50 mg daily and to continue amlodipine, flu vaccine given, I-STOP checked and zolpidem renewed and to follow up in 1 month

## 2023-01-07 NOTE — HISTORY OF PRESENT ILLNESS
[FreeTextEntry1] : pt presents for flu shot \par pt c/o dizzy spells x 2 weeks  [de-identified] : For the past few weeks he has been feeling lightheaded especially with changes in position and he has had some mild headaches.  He had lab testing done yesterday ordered by the endocrinologist.  He also had labs done recently by [facility].  He would like to renew zolpidem today.  He has also been vomiting and needs a procedure to have a muscle cut so his stomach will empty.  He is still coughing and was just started on Trelegy

## 2023-01-07 NOTE — REVIEW OF SYSTEMS
[Cough] : cough [Vomiting] : vomiting [Dizziness] : dizziness [Insomnia] : insomnia [Anxiety] : anxiety [Negative] : Heme/Lymph

## 2023-02-02 ENCOUNTER — APPOINTMENT (OUTPATIENT)
Dept: FAMILY MEDICINE | Facility: CLINIC | Age: 58
End: 2023-02-02
Payer: COMMERCIAL

## 2023-02-02 VITALS — TEMPERATURE: 97.1 F

## 2023-02-02 VITALS
RESPIRATION RATE: 14 BRPM | DIASTOLIC BLOOD PRESSURE: 78 MMHG | SYSTOLIC BLOOD PRESSURE: 110 MMHG | WEIGHT: 202 LBS | HEIGHT: 74 IN | HEART RATE: 66 BPM | OXYGEN SATURATION: 98 % | BODY MASS INDEX: 25.93 KG/M2

## 2023-02-02 DIAGNOSIS — Z78.9 OTHER SPECIFIED HEALTH STATUS: ICD-10-CM

## 2023-02-02 PROCEDURE — 99213 OFFICE O/P EST LOW 20 MIN: CPT

## 2023-02-02 NOTE — PHYSICAL EXAM
[No Acute Distress] : no acute distress [Well Nourished] : well nourished [Well Developed] : well developed [Well-Appearing] : well-appearing [Normal Voice/Communication] : normal voice/communication [Normal Outer Ear/Nose] : the outer ears and nose were normal in appearance [Normal TMs] : both tympanic membranes were normal [No Lymphadenopathy] : no lymphadenopathy [Supple] : supple [No Respiratory Distress] : no respiratory distress  [No Accessory Muscle Use] : no accessory muscle use [Clear to Auscultation] : lungs were clear to auscultation bilaterally [Normal Rate] : normal rate  [Regular Rhythm] : with a regular rhythm [Normal S1, S2] : normal S1 and S2 [No Edema] : there was no peripheral edema [Soft] : abdomen soft [Non Tender] : non-tender [Non-distended] : non-distended [Coordination Grossly Intact] : coordination grossly intact [Normal Mood] : the mood was normal

## 2023-02-02 NOTE — ASSESSMENT
[Patient Optimized for Surgery] : Patient optimized for surgery [No Further Testing Recommended] : no further testing recommended [Continue medications as is] : Continue current medications [As per surgery] : as per surgery [FreeTextEntry4] : he is medically cleared for the procedure

## 2023-02-02 NOTE — HISTORY OF PRESENT ILLNESS
[Atrial Fibrillation] : atrial fibrillation [No Adverse Anesthesia Reaction] : no adverse anesthesia reaction in self or family member [Diabetes] : diabetes [(Patient denies any chest pain, claudication, dyspnea on exertion, orthopnea, palpitations or syncope)] : Patient denies any chest pain, claudication, dyspnea on exertion, orthopnea, palpitations or syncope [Aortic Stenosis] : no aortic stenosis [Coronary Artery Disease] : no coronary artery disease [Recent Myocardial Infarction] : no recent myocardial infarction [Implantable Device/Pacemaker] : no implantable device/pacemaker [Asthma] : no asthma [COPD] : no COPD [Sleep Apnea] : no sleep apnea [Smoker] : not a smoker [Family Member] : no family member with adverse anesthesia reaction/sudden death [Self] : no previous adverse anesthesia reaction [Chronic Anticoagulation] : no chronic anticoagulation [Chronic Kidney Disease] : no chronic kidney disease [FreeTextEntry1] : pyloroplasty [FreeTextEntry2] : 02/03/2023/[facility] [FreeTextEntry3] : Dr.Lionel Urisa [FreeTextEntry4] : patient presents for medical clearance  [FreeTextEntry5] : hs hypertension and had atrial fibrillation, has a persistent cough, had a pulmonary work up

## 2023-02-02 NOTE — PLAN
[FreeTextEntry1] : the presurgical lab report was reviewed and the labs are stable, he is medically cleared for the procedure and was advised to use the Trelegy today, he doesn't use it regularly

## 2023-02-28 ENCOUNTER — APPOINTMENT (OUTPATIENT)
Dept: FAMILY MEDICINE | Facility: CLINIC | Age: 58
End: 2023-02-28
Payer: COMMERCIAL

## 2023-02-28 VITALS — TEMPERATURE: 95 F

## 2023-02-28 VITALS — SYSTOLIC BLOOD PRESSURE: 112 MMHG | DIASTOLIC BLOOD PRESSURE: 70 MMHG

## 2023-02-28 VITALS
BODY MASS INDEX: 25.54 KG/M2 | WEIGHT: 199 LBS | HEART RATE: 72 BPM | OXYGEN SATURATION: 99 % | DIASTOLIC BLOOD PRESSURE: 70 MMHG | RESPIRATION RATE: 14 BRPM | HEIGHT: 74 IN | SYSTOLIC BLOOD PRESSURE: 102 MMHG

## 2023-02-28 PROCEDURE — 99213 OFFICE O/P EST LOW 20 MIN: CPT

## 2023-02-28 RX ORDER — FLASH GLUCOSE SENSOR
KIT MISCELLANEOUS
Qty: 6 | Refills: 3 | Status: ACTIVE | COMMUNITY
Start: 2023-02-28 | End: 1900-01-01

## 2023-02-28 NOTE — HISTORY OF PRESENT ILLNESS
[FreeTextEntry8] : Patient states he saw his GI doctor this morning and was told to check up with his PCP\par Patient states he had a procedure done earlier this month and since Thursday he began vomiting 2x a day and coughing.  He had the pyloroplasty 3 weeks ago.  He saw the pulmonologist yesterday.  He saw Dr Diamond, GI specialist, this morning.  The surgeon is out of the country.  Dr Diamond stopped one of his medications, Reglan,  and gave him samples of another one and said if that doesn't work he may need a pacemaker placed in his stomach.  Dr Diamond wants him to see me to be put on long term disability.  He did return to work last week but has been out of work since 2/25, he worked on 2/24.  He also is in the process of possibly changing endocrinologists, his insurance isn't covering the Dexcom and needs a rx for the Freestyle Jackie

## 2023-02-28 NOTE — PHYSICAL EXAM
[No Acute Distress] : no acute distress [Well Developed] : well developed [Ill-Appearing] : ill-appearing [No Respiratory Distress] : no respiratory distress  [No Accessory Muscle Use] : no accessory muscle use [Clear to Auscultation] : lungs were clear to auscultation bilaterally [Normal Rate] : normal rate  [Regular Rhythm] : with a regular rhythm [Normal S1, S2] : normal S1 and S2 [Coordination Grossly Intact] : coordination grossly intact [Normal Mood] : the mood was normal

## 2023-02-28 NOTE — PLAN
[FreeTextEntry1] : he brought in a note from the GI specialist and it was reviewed, he will take the new medication as per GI, he was given a letter to be out of work through 4/15 and will follow up with me prior to returning to work

## 2023-02-28 NOTE — REVIEW OF SYSTEMS
[Fatigue] : fatigue [Cough] : cough [Nausea] : nausea [Vomiting] : vomiting [Dizziness] : dizziness [Negative] : Heme/Lymph

## 2023-03-01 ENCOUNTER — RX RENEWAL (OUTPATIENT)
Age: 58
End: 2023-03-01

## 2023-03-30 ENCOUNTER — RX RENEWAL (OUTPATIENT)
Age: 58
End: 2023-03-30

## 2023-04-04 ENCOUNTER — NON-APPOINTMENT (OUTPATIENT)
Age: 58
End: 2023-04-04

## 2023-04-18 ENCOUNTER — TRANSCRIPTION ENCOUNTER (OUTPATIENT)
Age: 58
End: 2023-04-18

## 2023-05-01 ENCOUNTER — APPOINTMENT (OUTPATIENT)
Dept: FAMILY MEDICINE | Facility: CLINIC | Age: 58
End: 2023-05-01
Payer: COMMERCIAL

## 2023-05-01 VITALS
RESPIRATION RATE: 14 BRPM | HEIGHT: 74 IN | SYSTOLIC BLOOD PRESSURE: 126 MMHG | TEMPERATURE: 99.1 F | OXYGEN SATURATION: 98 % | BODY MASS INDEX: 24.51 KG/M2 | DIASTOLIC BLOOD PRESSURE: 74 MMHG | HEART RATE: 60 BPM | WEIGHT: 191 LBS

## 2023-05-01 PROCEDURE — 99214 OFFICE O/P EST MOD 30 MIN: CPT

## 2023-05-01 NOTE — PLAN
[FreeTextEntry1] : it is medically necessary for him to extend his disability from work.  He was diagnosed with a stricture and needs surgical treatment.  He will be unable to return to work until 7/5/23.  He will follow up with me at the end of June for further determination on his final return to work status.  The last two oncology notes and the esophagram were all reviewed

## 2023-05-01 NOTE — REVIEW OF SYSTEMS
[Fatigue] : fatigue [Recent Change In Weight] : ~T recent weight change [Cough] : cough [Vomiting] : vomiting [Negative] : Gastrointestinal [Shortness Of Breath] : no shortness of breath [Wheezing] : no wheezing [Abdominal Pain] : no abdominal pain

## 2023-05-01 NOTE — PHYSICAL EXAM
[Ill-Appearing] : ill-appearing [Cachectic] : cachexia was observed [No Respiratory Distress] : no respiratory distress  [No Accessory Muscle Use] : no accessory muscle use [Clear to Auscultation] : lungs were clear to auscultation bilaterally [Normal Rate] : normal rate  [Regular Rhythm] : with a regular rhythm [Normal S1, S2] : normal S1 and S2 [Coordination Grossly Intact] : coordination grossly intact [Normal Mood] : the mood was normal

## 2023-05-01 NOTE — HISTORY OF PRESENT ILLNESS
[FreeTextEntry1] : patient presents for medication renewal\par  [de-identified] : Unfortunately the new medication didn't help with the vomiting.  He is still vomiting every time he eats solid food.  He is able to hold down some liquids and soft foods.  He had a swallowing study done at [facility] and was found to have a blockage distally.  He is scheduled for surgery on 5/25/ at [facility] to try to relieve the blockage.  He feels very weak and is coughing often.  He needs to have his work disability extended.  He decided to stay with [facility].

## 2023-05-26 ENCOUNTER — RX RENEWAL (OUTPATIENT)
Age: 58
End: 2023-05-26

## 2023-06-05 ENCOUNTER — NON-APPOINTMENT (OUTPATIENT)
Age: 58
End: 2023-06-05

## 2023-06-06 ENCOUNTER — APPOINTMENT (OUTPATIENT)
Dept: FAMILY MEDICINE | Facility: CLINIC | Age: 58
End: 2023-06-06
Payer: COMMERCIAL

## 2023-06-06 VITALS
SYSTOLIC BLOOD PRESSURE: 116 MMHG | HEIGHT: 74 IN | HEART RATE: 70 BPM | OXYGEN SATURATION: 96 % | TEMPERATURE: 98.7 F | DIASTOLIC BLOOD PRESSURE: 80 MMHG | RESPIRATION RATE: 14 BRPM | WEIGHT: 191 LBS | BODY MASS INDEX: 24.51 KG/M2

## 2023-06-06 DIAGNOSIS — C15.9 MALIGNANT NEOPLASM OF ESOPHAGUS, UNSPECIFIED: ICD-10-CM

## 2023-06-06 DIAGNOSIS — K44.9 DIAPHRAGMATIC HERNIA W/OUT OBSTRUCTION OR GANGRENE: ICD-10-CM

## 2023-06-06 PROCEDURE — 99496 TRANSJ CARE MGMT HIGH F2F 7D: CPT

## 2023-06-06 RX ORDER — INSULIN LISPRO 100 [IU]/ML
(75-25) 100 INJECTION, SUSPENSION SUBCUTANEOUS
Refills: 0 | Status: DISCONTINUED | COMMUNITY
Start: 2020-03-17 | End: 2023-06-06

## 2023-06-06 RX ORDER — INSULIN DEGLUDEC INJECTION 100 U/ML
INJECTION, SOLUTION SUBCUTANEOUS
Refills: 0 | Status: DISCONTINUED | COMMUNITY
End: 2023-06-06

## 2023-06-06 NOTE — HISTORY OF PRESENT ILLNESS
[Post-hospitalization from ___ Hospital] : Post-hospitalization from [unfilled] Hospital [Admitted on: ___] : The patient was admitted on [unfilled] [Discharged on ___] : discharged on [unfilled] [Patient Contacted By: ____] : and contacted by [unfilled] [FreeTextEntry2] : He was admitted for the laparoscopic hernia repair with mesh insertion.  Apparently the stomach was kinked above the diaphragm.  He was able to tolerate liquid but ate eggs today and vomited.  He was advised to go back to liquids for the next 2 days then resume soft foods and to take ondansetron as needed.  He was advised against doing any lifting for 8 weeks.  He reports that his blood sugars are normal and he is now off of insulin.  He still feels weak and dizzy

## 2023-06-06 NOTE — PLAN
[FreeTextEntry1] : the operative report was reviewed and h is medical leave of absence is extended through 8/7.  He will follow up at the end of July for reassessment of return to work status or sooner prn

## 2023-06-06 NOTE — PHYSICAL EXAM
[No Acute Distress] : no acute distress [Well Developed] : well developed [Ill-Appearing] : ill-appearing [Cachectic] : cachexia was observed [No Respiratory Distress] : no respiratory distress  [Soft] : abdomen soft [Non-distended] : non-distended [Normal Bowel Sounds] : normal bowel sounds [Normal Mood] : the mood was normal [de-identified] : surgical glue is in place over surgical wounds

## 2023-07-22 ENCOUNTER — NON-APPOINTMENT (OUTPATIENT)
Age: 58
End: 2023-07-22

## 2023-07-27 ENCOUNTER — APPOINTMENT (OUTPATIENT)
Dept: FAMILY MEDICINE | Facility: CLINIC | Age: 58
End: 2023-07-27
Payer: COMMERCIAL

## 2023-07-27 ENCOUNTER — NON-APPOINTMENT (OUTPATIENT)
Age: 58
End: 2023-07-27

## 2023-07-27 VITALS
WEIGHT: 176 LBS | OXYGEN SATURATION: 98 % | HEART RATE: 94 BPM | TEMPERATURE: 97.6 F | BODY MASS INDEX: 22.6 KG/M2 | RESPIRATION RATE: 15 BRPM | SYSTOLIC BLOOD PRESSURE: 100 MMHG | DIASTOLIC BLOOD PRESSURE: 80 MMHG

## 2023-07-27 VITALS — SYSTOLIC BLOOD PRESSURE: 124 MMHG | DIASTOLIC BLOOD PRESSURE: 70 MMHG

## 2023-07-27 DIAGNOSIS — I10 ESSENTIAL (PRIMARY) HYPERTENSION: ICD-10-CM

## 2023-07-27 PROCEDURE — 99214 OFFICE O/P EST MOD 30 MIN: CPT

## 2023-07-27 RX ORDER — LOSARTAN POTASSIUM 100 MG/1
100 TABLET, FILM COATED ORAL DAILY
Refills: 0 | Status: DISCONTINUED | COMMUNITY
Start: 2023-01-07 | End: 2023-07-27

## 2023-07-27 RX ORDER — AMLODIPINE BESYLATE 5 MG/1
5 TABLET ORAL
Qty: 90 | Refills: 3 | Status: DISCONTINUED | COMMUNITY
Start: 2017-10-23 | End: 2023-07-27

## 2023-07-27 NOTE — HISTORY OF PRESENT ILLNESS
[FreeTextEntry1] : Patient presents for a follow up on medication [de-identified] : He followed up with the surgeon in [facility] and he is still vomiting and still coughing.  He had a swallowing study but they couldn't come up with anything to explain his symptoms.  He saw the oncologist and is cancer free.  He has an appointment with the gastroenterologist next week.   He has spinning dizziness and a lightheaded feeling.  His BP at home has been in the 101-116/55-77 range.  He has been off balance at times.  He hasn't been taking his BP medications daily, he hasn't had any BP medications in a few days.  He is able to keep down pudding and jello but he ate a turkey sandwich today and then coughed and vomited.  He has appointment early next month with a nutritionist and his surgeon and oncologist

## 2023-07-27 NOTE — REVIEW OF SYSTEMS
[Fatigue] : fatigue [Recent Change In Weight] : ~T recent weight change [Cough] : cough [Vomiting] : vomiting [Dizziness] : dizziness [Unsteady Walk] : ataxia [Negative] : Heme/Lymph [Shortness Of Breath] : no shortness of breath [Wheezing] : no wheezing

## 2023-07-27 NOTE — PHYSICAL EXAM
[No Acute Distress] : no acute distress [Well Developed] : well developed [No Respiratory Distress] : no respiratory distress  [No Accessory Muscle Use] : no accessory muscle use [Clear to Auscultation] : lungs were clear to auscultation bilaterally [Normal Rate] : normal rate  [Regular Rhythm] : with a regular rhythm [Normal S1, S2] : normal S1 and S2 [No Edema] : there was no peripheral edema [Normal Mood] : the mood was normal [de-identified] : he appears very thin

## 2023-08-30 ENCOUNTER — APPOINTMENT (OUTPATIENT)
Dept: FAMILY MEDICINE | Facility: CLINIC | Age: 58
End: 2023-08-30
Payer: COMMERCIAL

## 2023-08-30 VITALS
SYSTOLIC BLOOD PRESSURE: 120 MMHG | DIASTOLIC BLOOD PRESSURE: 80 MMHG | BODY MASS INDEX: 22.33 KG/M2 | HEIGHT: 74 IN | HEART RATE: 71 BPM | TEMPERATURE: 98 F | OXYGEN SATURATION: 98 % | WEIGHT: 174 LBS

## 2023-08-30 VITALS — SYSTOLIC BLOOD PRESSURE: 132 MMHG | DIASTOLIC BLOOD PRESSURE: 70 MMHG

## 2023-08-30 DIAGNOSIS — R26.89 OTHER ABNORMALITIES OF GAIT AND MOBILITY: ICD-10-CM

## 2023-08-30 DIAGNOSIS — R42 DIZZINESS AND GIDDINESS: ICD-10-CM

## 2023-08-30 PROCEDURE — 99214 OFFICE O/P EST MOD 30 MIN: CPT

## 2023-08-30 NOTE — REVIEW OF SYSTEMS
[Fatigue] : fatigue [Recent Change In Weight] : ~T recent weight change [Abdominal Pain] : abdominal pain [Dizziness] : dizziness [Unsteady Walk] : ataxia [Suicidal] : not suicidal [Insomnia] : insomnia [Anxiety] : anxiety [Depression] : depression [Negative] : Heme/Lymph [FreeTextEntry2] : lost a few more pounds [FreeTextEntry4] : choking when swallowing

## 2023-08-30 NOTE — PLAN
[FreeTextEntry1] : he remains totally disabled from work due to weakness, vomiting, dizziness and being off balance.  For the anxiety and depression he will continue to take bupropion  mg and citalopram 20 mg daily and for the insomnia may continue taking zolpidem 5 mg at HS prn.  He was given a note to stay out of work indefinitely.  He will follow up with specialists as scheduled and with me in 6 weeks or sooner prn.

## 2023-08-30 NOTE — HISTORY OF PRESENT ILLNESS
[FreeTextEntry1] : Patient presents for medication follow up [de-identified] : He saw a new gastric surgeon who feels that he may be a good candidate for the gastric pacemaker but he needs to review the records.  He saw ENT and they said they couldn't help him and he isn't aspirating at this time.  He still feels dizzy.  He is in PT for a problem with his balance.  He needs to rest periodically during the PT program.  He knows that he needs to schedule his eye exam.

## 2023-08-30 NOTE — PHYSICAL EXAM
[No Acute Distress] : no acute distress [No Respiratory Distress] : no respiratory distress  [Normal Mood] : the mood was normal [de-identified] : appears very thin

## 2023-10-09 ENCOUNTER — APPOINTMENT (OUTPATIENT)
Dept: FAMILY MEDICINE | Facility: CLINIC | Age: 58
End: 2023-10-09

## 2023-10-18 ENCOUNTER — NON-APPOINTMENT (OUTPATIENT)
Age: 58
End: 2023-10-18

## 2023-10-21 ENCOUNTER — RX RENEWAL (OUTPATIENT)
Age: 58
End: 2023-10-21

## 2023-10-31 ENCOUNTER — APPOINTMENT (OUTPATIENT)
Dept: FAMILY MEDICINE | Facility: CLINIC | Age: 58
End: 2023-10-31
Payer: COMMERCIAL

## 2023-10-31 VITALS
HEIGHT: 74 IN | DIASTOLIC BLOOD PRESSURE: 78 MMHG | SYSTOLIC BLOOD PRESSURE: 120 MMHG | RESPIRATION RATE: 12 BRPM | HEART RATE: 64 BPM | WEIGHT: 173 LBS | OXYGEN SATURATION: 98 % | BODY MASS INDEX: 22.2 KG/M2

## 2023-10-31 DIAGNOSIS — K92.9 DISEASE OF DIGESTIVE SYSTEM, UNSPECIFIED: ICD-10-CM

## 2023-10-31 DIAGNOSIS — M54.2 CERVICALGIA: ICD-10-CM

## 2023-10-31 DIAGNOSIS — K31.89 DISEASE OF DIGESTIVE SYSTEM, UNSPECIFIED: ICD-10-CM

## 2023-10-31 DIAGNOSIS — Z86.711 PERSONAL HISTORY OF PULMONARY EMBOLISM: ICD-10-CM

## 2023-10-31 DIAGNOSIS — Z23 ENCOUNTER FOR IMMUNIZATION: ICD-10-CM

## 2023-10-31 DIAGNOSIS — G89.29 CERVICALGIA: ICD-10-CM

## 2023-10-31 PROCEDURE — 99213 OFFICE O/P EST LOW 20 MIN: CPT | Mod: 25

## 2023-10-31 PROCEDURE — 90686 IIV4 VACC NO PRSV 0.5 ML IM: CPT

## 2023-10-31 PROCEDURE — G0008: CPT

## 2023-10-31 RX ORDER — MOMETASONE FUROATE 1 MG/G
0.1 OINTMENT TOPICAL
Qty: 45 | Refills: 2 | Status: DISCONTINUED | COMMUNITY
Start: 2022-07-05 | End: 2023-10-31

## 2023-10-31 RX ORDER — FLUTICASONE FUROATE, UMECLIDINIUM BROMIDE AND VILANTEROL TRIFENATATE 200; 62.5; 25 UG/1; UG/1; UG/1
POWDER RESPIRATORY (INHALATION)
Refills: 0 | Status: DISCONTINUED | COMMUNITY
End: 2023-10-31

## 2023-10-31 RX ORDER — INSULIN ASPART 100 [IU]/ML
100 INJECTION, SOLUTION INTRAVENOUS; SUBCUTANEOUS
Qty: 45 | Refills: 0 | Status: DISCONTINUED | COMMUNITY
Start: 2022-01-06 | End: 2023-10-31

## 2023-10-31 RX ORDER — ONDANSETRON 4 MG/1
4 TABLET, ORALLY DISINTEGRATING ORAL
Refills: 0 | Status: DISCONTINUED | COMMUNITY
End: 2023-10-31

## 2023-10-31 RX ORDER — LORAZEPAM 0.5 MG/1
0.5 TABLET ORAL
Qty: 42 | Refills: 0 | Status: DISCONTINUED | COMMUNITY
Start: 2022-06-08 | End: 2023-10-31

## 2023-11-22 ENCOUNTER — RX CHANGE (OUTPATIENT)
Age: 58
End: 2023-11-22

## 2023-11-22 RX ORDER — TIZANIDINE 2 MG/1
2 TABLET ORAL
Qty: 90 | Refills: 1 | Status: DISCONTINUED | COMMUNITY
Start: 2023-10-31 | End: 2023-11-22

## 2023-12-14 ENCOUNTER — APPOINTMENT (OUTPATIENT)
Dept: FAMILY MEDICINE | Facility: CLINIC | Age: 58
End: 2023-12-14
Payer: COMMERCIAL

## 2023-12-14 VITALS
WEIGHT: 167 LBS | HEART RATE: 102 BPM | OXYGEN SATURATION: 100 % | DIASTOLIC BLOOD PRESSURE: 72 MMHG | BODY MASS INDEX: 21.43 KG/M2 | TEMPERATURE: 99.2 F | HEIGHT: 74 IN | SYSTOLIC BLOOD PRESSURE: 110 MMHG | RESPIRATION RATE: 15 BRPM

## 2023-12-14 VITALS — HEART RATE: 84 BPM

## 2023-12-14 DIAGNOSIS — E11.43 TYPE 2 DIABETES MELLITUS WITH DIABETIC AUTONOMIC (POLY)NEUROPATHY: ICD-10-CM

## 2023-12-14 DIAGNOSIS — K31.84 TYPE 2 DIABETES MELLITUS WITH DIABETIC AUTONOMIC (POLY)NEUROPATHY: ICD-10-CM

## 2023-12-14 PROCEDURE — 99213 OFFICE O/P EST LOW 20 MIN: CPT

## 2023-12-14 NOTE — HISTORY OF PRESENT ILLNESS
[FreeTextEntry1] : Patient presents for routine follow up  [de-identified] : He had the gastric pacemaker placed but he's still vomiting and was told that there's still a blockage.  He will be having a balloon procedure done next Tuesday and if it doesn't work he may need to have a feeding tube and laparoscopic surgery.  He had A fib after the pacemaker was placed and followed up with cardiology.  He has been checking his blood pressure and it's fluctuating as is the heart rate.

## 2023-12-14 NOTE — PLAN
[FreeTextEntry1] : The cardiology note was reviewed.  He is medically unable to return to work at this time.  I checked I-STOP and renewed zolpidem 5 mg at  and he will follow up in 1 month

## 2023-12-14 NOTE — PHYSICAL EXAM
[No Acute Distress] : no acute distress [Well Developed] : well developed [Ill-Appearing] : ill-appearing [No Respiratory Distress] : no respiratory distress  [No Accessory Muscle Use] : no accessory muscle use [Clear to Auscultation] : lungs were clear to auscultation bilaterally [Normal Rate] : normal rate  [Regular Rhythm] : with a regular rhythm [Normal S1, S2] : normal S1 and S2 [Normal Mood] : the mood was normal

## 2023-12-14 NOTE — REVIEW OF SYSTEMS
[Fatigue] : fatigue [Recent Change In Weight] : ~T recent weight change [Vomiting] : vomiting [Dizziness] : dizziness [Suicidal] : not suicidal [Insomnia] : insomnia [Negative] : Heme/Lymph

## 2023-12-24 ENCOUNTER — RX CHANGE (OUTPATIENT)
Age: 58
End: 2023-12-24

## 2023-12-24 RX ORDER — TIZANIDINE 2 MG/1
2 TABLET ORAL
Qty: 90 | Refills: 1 | Status: DISCONTINUED | COMMUNITY
Start: 2023-11-22 | End: 2023-12-24

## 2024-01-15 ENCOUNTER — APPOINTMENT (OUTPATIENT)
Dept: FAMILY MEDICINE | Facility: CLINIC | Age: 59
End: 2024-01-15
Payer: COMMERCIAL

## 2024-01-15 VITALS
HEIGHT: 74 IN | SYSTOLIC BLOOD PRESSURE: 110 MMHG | DIASTOLIC BLOOD PRESSURE: 76 MMHG | HEART RATE: 68 BPM | TEMPERATURE: 98.1 F | RESPIRATION RATE: 15 BRPM | OXYGEN SATURATION: 99 % | WEIGHT: 162 LBS | BODY MASS INDEX: 20.79 KG/M2

## 2024-01-15 DIAGNOSIS — R53.1 WEAKNESS: ICD-10-CM

## 2024-01-15 DIAGNOSIS — C15.9 MALIGNANT NEOPLASM OF ESOPHAGUS, UNSPECIFIED: ICD-10-CM

## 2024-01-15 PROCEDURE — 99213 OFFICE O/P EST LOW 20 MIN: CPT

## 2024-01-15 PROCEDURE — G2211 COMPLEX E/M VISIT ADD ON: CPT

## 2024-01-15 NOTE — REVIEW OF SYSTEMS
[Fatigue] : fatigue [Recent Change In Weight] : ~T recent weight change [Vomiting] : vomiting [Muscle Weakness] : muscle weakness [Headache] : no headache [Dizziness] : dizziness [Negative] : Heme/Lymph

## 2024-01-15 NOTE — PHYSICAL EXAM
[Ill-Appearing] : ill-appearing [Cachectic] : cachexia was observed [No Respiratory Distress] : no respiratory distress  [Normal Mood] : the mood was normal

## 2024-01-15 NOTE — HISTORY OF PRESENT ILLNESS
[FreeTextEntry1] : Patient presents for 1 month follow up [de-identified] : He was admitted to [facility] for 1 week right after our last visit for dehydration.  A feeding tube was placed and worked for a week then it became blocked.  He had to go back to the hospital on 12/31 and it was replaced and it became blocked again.  He spoke with the specialist and was told to remove it.  He had a balloon procedure done and will have another one done to try to reduce the blockage and will have another feeding tube placed then.  He has been vomiting still.

## 2024-01-17 ENCOUNTER — RX CHANGE (OUTPATIENT)
Age: 59
End: 2024-01-17

## 2024-01-17 RX ORDER — TIZANIDINE 2 MG/1
2 TABLET ORAL
Qty: 90 | Refills: 1 | Status: DISCONTINUED | COMMUNITY
Start: 2023-12-24 | End: 2024-01-17

## 2024-01-20 ENCOUNTER — RX RENEWAL (OUTPATIENT)
Age: 59
End: 2024-01-20

## 2024-02-12 ENCOUNTER — APPOINTMENT (OUTPATIENT)
Dept: FAMILY MEDICINE | Facility: CLINIC | Age: 59
End: 2024-02-12
Payer: COMMERCIAL

## 2024-02-12 VITALS
WEIGHT: 164 LBS | OXYGEN SATURATION: 98 % | RESPIRATION RATE: 12 BRPM | HEIGHT: 74 IN | HEART RATE: 66 BPM | SYSTOLIC BLOOD PRESSURE: 134 MMHG | BODY MASS INDEX: 21.05 KG/M2 | DIASTOLIC BLOOD PRESSURE: 70 MMHG

## 2024-02-12 VITALS — DIASTOLIC BLOOD PRESSURE: 72 MMHG | SYSTOLIC BLOOD PRESSURE: 124 MMHG

## 2024-02-12 DIAGNOSIS — R20.2 PARESTHESIA OF SKIN: ICD-10-CM

## 2024-02-12 DIAGNOSIS — R44.8 OTHER SYMPTOMS AND SIGNS INVOLVING GENERAL SENSATIONS AND PERCEPTIONS: ICD-10-CM

## 2024-02-12 PROCEDURE — G2211 COMPLEX E/M VISIT ADD ON: CPT | Mod: NC,1L

## 2024-02-12 PROCEDURE — 99214 OFFICE O/P EST MOD 30 MIN: CPT

## 2024-02-12 RX ORDER — METOPROLOL SUCCINATE 25 MG/1
25 TABLET, EXTENDED RELEASE ORAL
Qty: 14 | Refills: 0 | Status: DISCONTINUED | COMMUNITY
Start: 2023-11-08

## 2024-02-12 NOTE — REVIEW OF SYSTEMS
[Fatigue] : fatigue [Abdominal Pain] : abdominal pain [Constipation] : constipation [Diarrhea] : diarrhea [Vomiting] : vomiting [Dizziness] : dizziness [Insomnia] : insomnia [Anxiety] : anxiety [Depression] : depression [Negative] : Heme/Lymph [Suicidal] : not suicidal [FreeTextEntry5] : extremities feel cold [de-identified] : tingling in extremities

## 2024-02-12 NOTE — ADDENDUM
[FreeTextEntry1] : I received the lab report including CMP, B12 and CBC.  I called the patient and advised him that I will order a thyroid panel and will fax the rx for the testing to [facility] and will call him with results.

## 2024-02-12 NOTE — PHYSICAL EXAM
[No Acute Distress] : no acute distress [Well Developed] : well developed [Ill-Appearing] : ill-appearing [No Respiratory Distress] : no respiratory distress  [Normal Mood] : the mood was normal

## 2024-02-12 NOTE — HISTORY OF PRESENT ILLNESS
[FreeTextEntry1] : pt presents for med follow up  [de-identified] : He had another dilation done and had the feeding tube replaced.  He still vomits but not as often.  He has been having stomach pains. His hands and feet get tingly and very cold.  He is having irregular bowel movements, sometimes loose and sometimes hard. He saw Dr Beach and had his B12 level tested and it was normal.

## 2024-02-12 NOTE — PLAN
[FreeTextEntry1] : I will obtain the lab results and will call him if any further lab testing is needed to further assess the extremity coldness and tingling. He is still totally disabled from work due to the persistent vomiting, fatigue and weakness.  For the stable anxiety and depression he will continue taking Bupropion xl 150 mg daily and Citalopram 20 mg daily.  For the stable insomnia he will continue taking Zolpidem 5 mg for sleep and for the back pain will continue taking tizanidine 2 mg tid prn.

## 2024-03-12 ENCOUNTER — APPOINTMENT (OUTPATIENT)
Dept: FAMILY MEDICINE | Facility: CLINIC | Age: 59
End: 2024-03-12
Payer: COMMERCIAL

## 2024-03-12 VITALS
OXYGEN SATURATION: 98 % | DIASTOLIC BLOOD PRESSURE: 82 MMHG | RESPIRATION RATE: 12 BRPM | SYSTOLIC BLOOD PRESSURE: 110 MMHG | HEART RATE: 78 BPM | BODY MASS INDEX: 21.3 KG/M2 | HEIGHT: 74 IN | TEMPERATURE: 98.1 F | WEIGHT: 166 LBS

## 2024-03-12 DIAGNOSIS — R42 DIZZINESS AND GIDDINESS: ICD-10-CM

## 2024-03-12 DIAGNOSIS — E11.9 TYPE 2 DIABETES MELLITUS W/OUT COMPLICATIONS: ICD-10-CM

## 2024-03-12 PROCEDURE — G2211 COMPLEX E/M VISIT ADD ON: CPT

## 2024-03-12 PROCEDURE — 99214 OFFICE O/P EST MOD 30 MIN: CPT

## 2024-03-12 NOTE — REVIEW OF SYSTEMS
[Fatigue] : fatigue [Abdominal Pain] : abdominal pain [Vomiting] : vomiting [Dizziness] : dizziness [Insomnia] : insomnia [Suicidal] : not suicidal [Anxiety] : anxiety [Depression] : depression [Negative] : Heme/Lymph

## 2024-03-12 NOTE — HISTORY OF PRESENT ILLNESS
[FreeTextEntry1] : patient presents for 1 month follow up [de-identified] : He is still vomiting but not as often.  He still has the feeding tube in place.  He feels more pressure in the upper abdomen now.  He is scheduled for a follow up visit next week to discuss another dilation and endoscopy.  He gained 2 pounds.  He hasn't scheduled the eye exam yet.  His blood sugar has been in the  range.  He would like to renew zolpidem which is working well for the insomnia.  He reports that the depression is stable.

## 2024-03-12 NOTE — PHYSICAL EXAM
Hypertension- patients hypertension is controlled today with a Blood pressure of Blood Pressure: 120/70     on current medications  Continue current medications  Discussed DASH diet and exercise  [Well Developed] : well developed [No Acute Distress] : no acute distress [Cachectic] : cachexia was observed [Normal Mood] : the mood was normal

## 2024-03-12 NOTE — PLAN
[FreeTextEntry1] : He was again advised to schedule the eye exam.  I-STOP was checked and for the stable insomnia I renewed zolpidem 10 mg at HS>  For the stable depression and anxiety he will continue taking bupropion xl 150 mg daily and citalopram 20 mg daily.  He is still totally disabled from work. I ordered a A1C to evaluate his diabetes status.  He will follow up in 2 months or sooner prn

## 2024-03-12 NOTE — CURRENT MEDS
[Takes medication as prescribed] : takes [None] : Patient does not have any barriers to medication adherence [Sedatives] : sedatives [Yes] : Reviewed medication list for presence of high-risk medications.

## 2024-03-26 ENCOUNTER — NON-APPOINTMENT (OUTPATIENT)
Age: 59
End: 2024-03-26

## 2024-03-27 LAB — HBA1C MFR BLD HPLC: 6.4

## 2024-04-16 ENCOUNTER — APPOINTMENT (OUTPATIENT)
Dept: FAMILY MEDICINE | Facility: CLINIC | Age: 59
End: 2024-04-16
Payer: COMMERCIAL

## 2024-04-16 VITALS
SYSTOLIC BLOOD PRESSURE: 116 MMHG | WEIGHT: 166 LBS | DIASTOLIC BLOOD PRESSURE: 68 MMHG | TEMPERATURE: 98.5 F | OXYGEN SATURATION: 100 % | BODY MASS INDEX: 21.3 KG/M2 | HEART RATE: 64 BPM | HEIGHT: 74 IN | RESPIRATION RATE: 12 BRPM

## 2024-04-16 DIAGNOSIS — Z01.818 ENCOUNTER FOR OTHER PREPROCEDURAL EXAMINATION: ICD-10-CM

## 2024-04-16 PROCEDURE — 99214 OFFICE O/P EST MOD 30 MIN: CPT

## 2024-04-16 RX ORDER — CITALOPRAM HYDROBROMIDE 20 MG/1
20 TABLET, FILM COATED ORAL
Qty: 90 | Refills: 0 | Status: COMPLETED | COMMUNITY
Start: 2017-11-15 | End: 2024-04-16

## 2024-04-16 RX ORDER — TIZANIDINE 2 MG/1
2 TABLET ORAL
Qty: 90 | Refills: 1 | Status: COMPLETED | COMMUNITY
Start: 2024-01-17 | End: 2024-04-16

## 2024-04-16 NOTE — PHYSICAL EXAM
[No Acute Distress] : no acute distress [Well Developed] : well developed [Well-Appearing] : well-appearing [Normal Sclera/Conjunctiva] : normal sclera/conjunctiva [Normal Outer Ear/Nose] : the outer ears and nose were normal in appearance [Normal Oropharynx] : the oropharynx was normal [Normal TMs] : both tympanic membranes were normal [No Lymphadenopathy] : no lymphadenopathy [Supple] : supple [No Respiratory Distress] : no respiratory distress  [No Accessory Muscle Use] : no accessory muscle use [Clear to Auscultation] : lungs were clear to auscultation bilaterally [Normal Rate] : normal rate  [Regular Rhythm] : with a regular rhythm [Normal S1, S2] : normal S1 and S2 [No Edema] : there was no peripheral edema [Non-distended] : non-distended [Coordination Grossly Intact] : coordination grossly intact [Normal Mood] : the mood was normal

## 2024-04-17 ENCOUNTER — RX RENEWAL (OUTPATIENT)
Age: 59
End: 2024-04-17

## 2024-04-17 RX ORDER — BUPROPION HYDROCHLORIDE 150 MG/1
150 TABLET, EXTENDED RELEASE ORAL
Qty: 90 | Refills: 0 | Status: ACTIVE | COMMUNITY
Start: 2017-11-03 | End: 1900-01-01

## 2024-04-18 NOTE — HISTORY OF PRESENT ILLNESS
[Atrial Fibrillation] : atrial fibrillation [No Pertinent Pulmonary History] : no history of asthma, COPD, sleep apnea, or smoking [No Adverse Anesthesia Reaction] : no adverse anesthesia reaction in self or family member [Diabetes] : diabetes [(Patient denies any chest pain, claudication, dyspnea on exertion, orthopnea, palpitations or syncope)] : Patient denies any chest pain, claudication, dyspnea on exertion, orthopnea, palpitations or syncope [Good (7-10 METs)] : Good (7-10 METs) [Aortic Stenosis] : no aortic stenosis [Coronary Artery Disease] : no coronary artery disease [Recent Myocardial Infarction] : no recent myocardial infarction [Implantable Device/Pacemaker] : no implantable device/pacemaker [Family Member] : no family member with adverse anesthesia reaction/sudden death [Self] : no previous adverse anesthesia reaction [Chronic Anticoagulation] : no chronic anticoagulation [Chronic Kidney Disease] : no chronic kidney disease [FreeTextEntry1] : Hiatal hernia repair  [FreeTextEntry2] : 5/2/2024 [FreeTextEntry3] : Dr Ndiaye [FreeTextEntry4] : pt presents for medical clearance  [FreeTextEntry5] : has had intermittent A fib and a prior history of hypertension

## 2024-04-18 NOTE — ASSESSMENT
[Patient Optimized for Surgery] : Patient optimized for surgery [No Further Testing Recommended] : no further testing recommended [Continue medications as is] : Continue current medications [As per surgery] : as per surgery [Continue anticoagulant treatment as is] : Continue current anticoagulant treatment [Continue anti-platelet treatment as is] : Continue current anti-platelet treatment [FreeTextEntry4] : He is medically cleared for surgery

## 2024-07-01 ENCOUNTER — APPOINTMENT (OUTPATIENT)
Dept: FAMILY MEDICINE | Facility: CLINIC | Age: 59
End: 2024-07-01
Payer: COMMERCIAL

## 2024-07-01 VITALS
RESPIRATION RATE: 14 BRPM | SYSTOLIC BLOOD PRESSURE: 110 MMHG | HEIGHT: 74 IN | WEIGHT: 168 LBS | HEART RATE: 71 BPM | DIASTOLIC BLOOD PRESSURE: 74 MMHG | TEMPERATURE: 98.5 F | OXYGEN SATURATION: 98 % | BODY MASS INDEX: 21.56 KG/M2

## 2024-07-01 DIAGNOSIS — K22.2 ESOPHAGEAL OBSTRUCTION: ICD-10-CM

## 2024-07-01 DIAGNOSIS — F41.9 ANXIETY DISORDER, UNSPECIFIED: ICD-10-CM

## 2024-07-01 DIAGNOSIS — F32.A ANXIETY DISORDER, UNSPECIFIED: ICD-10-CM

## 2024-07-01 DIAGNOSIS — R11.15 CYCLICAL VOMITING SYNDROME UNRELATED TO MIGRAINE: ICD-10-CM

## 2024-07-01 DIAGNOSIS — R05.3 CHRONIC COUGH: ICD-10-CM

## 2024-07-01 DIAGNOSIS — G47.00 INSOMNIA, UNSPECIFIED: ICD-10-CM

## 2024-07-01 PROCEDURE — 99213 OFFICE O/P EST LOW 20 MIN: CPT

## 2024-07-01 PROCEDURE — G2211 COMPLEX E/M VISIT ADD ON: CPT

## 2024-07-18 ENCOUNTER — RX RENEWAL (OUTPATIENT)
Age: 59
End: 2024-07-18

## 2024-10-01 ENCOUNTER — APPOINTMENT (OUTPATIENT)
Dept: FAMILY MEDICINE | Facility: CLINIC | Age: 59
End: 2024-10-01
Payer: COMMERCIAL

## 2024-10-01 VITALS
SYSTOLIC BLOOD PRESSURE: 128 MMHG | BODY MASS INDEX: 21.05 KG/M2 | OXYGEN SATURATION: 99 % | HEIGHT: 74 IN | RESPIRATION RATE: 12 BRPM | HEART RATE: 65 BPM | WEIGHT: 164 LBS | DIASTOLIC BLOOD PRESSURE: 70 MMHG

## 2024-10-01 DIAGNOSIS — K31.89 DISEASE OF DIGESTIVE SYSTEM, UNSPECIFIED: ICD-10-CM

## 2024-10-01 DIAGNOSIS — C15.9 MALIGNANT NEOPLASM OF ESOPHAGUS, UNSPECIFIED: ICD-10-CM

## 2024-10-01 DIAGNOSIS — I10 ESSENTIAL (PRIMARY) HYPERTENSION: ICD-10-CM

## 2024-10-01 DIAGNOSIS — F32.A ANXIETY DISORDER, UNSPECIFIED: ICD-10-CM

## 2024-10-01 DIAGNOSIS — K92.9 DISEASE OF DIGESTIVE SYSTEM, UNSPECIFIED: ICD-10-CM

## 2024-10-01 DIAGNOSIS — G47.00 INSOMNIA, UNSPECIFIED: ICD-10-CM

## 2024-10-01 DIAGNOSIS — F41.9 ANXIETY DISORDER, UNSPECIFIED: ICD-10-CM

## 2024-10-01 DIAGNOSIS — Z23 ENCOUNTER FOR IMMUNIZATION: ICD-10-CM

## 2024-10-01 PROCEDURE — 99213 OFFICE O/P EST LOW 20 MIN: CPT | Mod: 25

## 2024-10-01 PROCEDURE — 90656 IIV3 VACC NO PRSV 0.5 ML IM: CPT

## 2024-10-01 PROCEDURE — G0008: CPT

## 2024-10-01 NOTE — HISTORY OF PRESENT ILLNESS
[FreeTextEntry1] : pt presents for med follow up and flu shot  [de-identified] : He had his 9th or 10th dilation for the esophagus.  He also has a "kink" lower down and will need another surgery.  He will have another esophagram soon.  He still vomits but not as often.  His G tube fell out and is back in place now.  He plans to get his eyes checked.  He reports that the zolpidem is helping for the insomnia and the bupropion is working for the depression.  Two of his daughters just got engaged.

## 2024-10-01 NOTE — REVIEW OF SYSTEMS
[Fatigue] : fatigue [Vomiting] : vomiting [Negative] : Heme/Lymph [Recent Change In Weight] : ~T no recent weight change

## 2024-10-01 NOTE — PHYSICAL EXAM
[No Acute Distress] : no acute distress [Well Developed] : well developed [Well-Appearing] : well-appearing [No Respiratory Distress] : no respiratory distress  [Normal Mood] : the mood was normal

## 2024-10-01 NOTE — CURRENT MEDS
[Takes medication as prescribed] : takes [None] : Patient does not have any barriers to medication adherence [Yes] : Reviewed medication list for presence of high-risk medications. [Sedatives] : sedatives Spine appears normal, range of motion is not limited, no muscle or joint tenderness; R LE: + venous stasis changes, + bluish discoloration to 2nd toe, no tend, no palpable pulse

## 2024-10-01 NOTE — PLAN
[FreeTextEntry1] : The most recent oncology note was reviewed again today.  He was again advised to schedule his eye exam.  For the stable anxiety and depression, he will continue taking bupropion  mg daily and for the stable insomnia will continue taking zolpidem 5 mg at bedtime as needed.  He was advised to schedule follow-up in 3 to 4 months for a well visit and a flu vaccine was administered today.

## 2024-10-15 ENCOUNTER — OFFICE (OUTPATIENT)
Dept: URBAN - METROPOLITAN AREA CLINIC 103 | Facility: CLINIC | Age: 59
Setting detail: OPHTHALMOLOGY
End: 2024-10-15
Payer: COMMERCIAL

## 2024-10-15 VITALS — HEIGHT: 60 IN

## 2024-10-15 DIAGNOSIS — E11.3292: ICD-10-CM

## 2024-10-15 DIAGNOSIS — E11.3311: ICD-10-CM

## 2024-10-15 DIAGNOSIS — H35.103: ICD-10-CM

## 2024-10-15 DIAGNOSIS — H25.13: ICD-10-CM

## 2024-10-15 DIAGNOSIS — H53.002: ICD-10-CM

## 2024-10-15 DIAGNOSIS — H40.033: ICD-10-CM

## 2024-10-15 DIAGNOSIS — H52.7: ICD-10-CM

## 2024-10-15 DIAGNOSIS — H50.112: ICD-10-CM

## 2024-10-15 PROBLEM — H10.433 ALLERGIC CONJUNCTIVITIS ; BOTH EYES: Status: RESOLVED | Noted: 2024-10-15 | Resolved: 2024-10-15

## 2024-10-15 PROCEDURE — 92250 FUNDUS PHOTOGRAPHY W/I&R: CPT | Performed by: OPHTHALMOLOGY

## 2024-10-15 PROCEDURE — 92004 COMPRE OPH EXAM NEW PT 1/>: CPT | Performed by: OPHTHALMOLOGY

## 2024-10-15 PROCEDURE — 92015 DETERMINE REFRACTIVE STATE: CPT | Performed by: OPHTHALMOLOGY

## 2024-10-15 ASSESSMENT — REFRACTION_CURRENTRX
OD_CYLINDER: SPHERE
OS_SPHERE: +1.00
OS_OVR_VA: 20/
OS_CYLINDER: -0.25
OD_SPHERE: +1.00
OS_SPHERE: +2.25
OD_SPHERE: +0.75
OD_ADD: +2.50
OD_OVR_VA: 20/
OS_AXIS: 091
OS_ADD: +2.50
OS_OVR_VA: 20/
OS_OVR_VA: 20/
OS_SPHERE: +1.00
OD_VPRISM_DIRECTION: PROGS
OD_SPHERE: +2.25
OS_CYLINDER: SPH
OS_VPRISM_DIRECTION: PROGS
OD_OVR_VA: 20/
OD_OVR_VA: 20/
OD_CYLINDER: SPH

## 2024-10-15 ASSESSMENT — KERATOMETRY
OD_AXISANGLE_DEGREES: 043
OS_K2POWER_DIOPTERS: 42.75
OD_K2POWER_DIOPTERS: 43.25
OS_AXISANGLE_DEGREES: 145
OD_K1POWER_DIOPTERS: 42.25
OS_K1POWER_DIOPTERS: 42.00

## 2024-10-15 ASSESSMENT — REFRACTION_MANIFEST
OS_VA1: 20/350
OS_CYLINDER: SPHERE
OS_SPHERE: +1.75
OD_SPHERE: +1.75
OD_CYLINDER: SPHERE
OD_ADD: +2.50
OD_VA1: 20/20
OS_ADD: +2.50

## 2024-10-15 ASSESSMENT — TONOMETRY
OS_IOP_MMHG: 18
OD_IOP_MMHG: 20

## 2024-10-15 ASSESSMENT — REFRACTION_AUTOREFRACTION
OS_SPHERE: UTP
OD_SPHERE: UTP

## 2024-10-15 ASSESSMENT — CONFRONTATIONAL VISUAL FIELD TEST (CVF)
OS_FINDINGS: FULL
OD_FINDINGS: FULL

## 2024-10-15 ASSESSMENT — VISUAL ACUITY
OD_BCVA: 20/350
OS_BCVA: 20/30-1

## 2024-10-16 ENCOUNTER — RX RENEWAL (OUTPATIENT)
Age: 59
End: 2024-10-16

## 2024-11-27 ENCOUNTER — OFFICE (OUTPATIENT)
Dept: URBAN - METROPOLITAN AREA CLINIC 103 | Facility: CLINIC | Age: 59
Setting detail: OPHTHALMOLOGY
End: 2024-11-27
Payer: COMMERCIAL

## 2024-11-27 DIAGNOSIS — E11.3312: ICD-10-CM

## 2024-11-27 DIAGNOSIS — H53.002: ICD-10-CM

## 2024-11-27 DIAGNOSIS — E11.3311: ICD-10-CM

## 2024-11-27 PROCEDURE — 92235 FLUORESCEIN ANGRPH MLTIFRAME: CPT | Performed by: SPECIALIST

## 2024-11-27 PROCEDURE — 67210 TREATMENT OF RETINAL LESION: CPT | Mod: RT | Performed by: SPECIALIST

## 2024-11-27 PROCEDURE — 92012 INTRM OPH EXAM EST PATIENT: CPT | Mod: 57 | Performed by: SPECIALIST

## 2024-11-27 PROCEDURE — 92134 CPTRZ OPH DX IMG PST SGM RTA: CPT | Performed by: SPECIALIST

## 2024-11-27 ASSESSMENT — REFRACTION_AUTOREFRACTION
OS_SPHERE: UTP
OD_SPHERE: UTP

## 2024-11-27 ASSESSMENT — CONFRONTATIONAL VISUAL FIELD TEST (CVF)
OS_FINDINGS: FULL
OD_FINDINGS: FULL

## 2024-11-27 ASSESSMENT — KERATOMETRY
OD_K1POWER_DIOPTERS: 42.25
OS_AXISANGLE_DEGREES: 145
OD_K2POWER_DIOPTERS: 43.25
OS_K1POWER_DIOPTERS: 42.00
OD_AXISANGLE_DEGREES: 043
OS_K2POWER_DIOPTERS: 42.75

## 2024-11-27 ASSESSMENT — TONOMETRY
OS_IOP_MMHG: 16
OD_IOP_MMHG: 20

## 2024-11-27 ASSESSMENT — VISUAL ACUITY
OD_BCVA: 20/250
OS_BCVA: 20/25-2

## 2024-12-04 ENCOUNTER — NON-APPOINTMENT (OUTPATIENT)
Age: 59
End: 2024-12-04

## 2024-12-19 ENCOUNTER — NON-APPOINTMENT (OUTPATIENT)
Age: 59
End: 2024-12-19

## 2024-12-19 ENCOUNTER — OFFICE (OUTPATIENT)
Dept: URBAN - METROPOLITAN AREA CLINIC 103 | Facility: CLINIC | Age: 59
Setting detail: OPHTHALMOLOGY
End: 2024-12-19
Payer: COMMERCIAL

## 2024-12-19 DIAGNOSIS — E11.3312: ICD-10-CM

## 2024-12-19 PROCEDURE — 67210 TREATMENT OF RETINAL LESION: CPT | Mod: 79,LT | Performed by: SPECIALIST

## 2024-12-19 ASSESSMENT — CONFRONTATIONAL VISUAL FIELD TEST (CVF)
OD_FINDINGS: FULL
OS_FINDINGS: FULL

## 2024-12-19 ASSESSMENT — TONOMETRY
OD_IOP_MMHG: 18
OS_IOP_MMHG: 16

## 2024-12-19 ASSESSMENT — KERATOMETRY
OS_K2POWER_DIOPTERS: 42.75
OD_K1POWER_DIOPTERS: 42.25
OD_AXISANGLE_DEGREES: 043
OD_K2POWER_DIOPTERS: 43.25
OS_AXISANGLE_DEGREES: 145
OS_K1POWER_DIOPTERS: 42.00

## 2024-12-19 ASSESSMENT — REFRACTION_AUTOREFRACTION
OS_SPHERE: UTP
OD_SPHERE: UTP

## 2024-12-19 ASSESSMENT — VISUAL ACUITY
OD_BCVA: 20/250
OS_BCVA: 20/25-2

## 2025-01-09 ENCOUNTER — RX RENEWAL (OUTPATIENT)
Age: 60
End: 2025-01-09

## 2025-01-17 ENCOUNTER — APPOINTMENT (OUTPATIENT)
Dept: FAMILY MEDICINE | Facility: CLINIC | Age: 60
End: 2025-01-17
Payer: COMMERCIAL

## 2025-01-17 VITALS
DIASTOLIC BLOOD PRESSURE: 80 MMHG | HEART RATE: 97 BPM | HEIGHT: 74 IN | RESPIRATION RATE: 12 BRPM | TEMPERATURE: 98.4 F | SYSTOLIC BLOOD PRESSURE: 118 MMHG | BODY MASS INDEX: 21.3 KG/M2 | OXYGEN SATURATION: 100 % | WEIGHT: 166 LBS

## 2025-01-17 DIAGNOSIS — F41.9 ANXIETY DISORDER, UNSPECIFIED: ICD-10-CM

## 2025-01-17 DIAGNOSIS — F32.A ANXIETY DISORDER, UNSPECIFIED: ICD-10-CM

## 2025-01-17 DIAGNOSIS — G47.00 INSOMNIA, UNSPECIFIED: ICD-10-CM

## 2025-01-17 PROCEDURE — 99213 OFFICE O/P EST LOW 20 MIN: CPT

## 2025-01-17 PROCEDURE — G2211 COMPLEX E/M VISIT ADD ON: CPT | Mod: NC

## 2025-01-17 RX ORDER — SOTALOL HYDROCHLORIDE 80 MG/1
80 TABLET ORAL DAILY
Refills: 0 | Status: ACTIVE | COMMUNITY

## 2025-01-17 RX ORDER — RIVAROXABAN 20 MG/1
20 TABLET, FILM COATED ORAL DAILY
Refills: 0 | Status: ACTIVE | COMMUNITY

## 2025-01-24 ENCOUNTER — OFFICE (OUTPATIENT)
Dept: URBAN - METROPOLITAN AREA CLINIC 103 | Facility: CLINIC | Age: 60
Setting detail: OPHTHALMOLOGY
End: 2025-01-24
Payer: COMMERCIAL

## 2025-01-24 DIAGNOSIS — H16.223: ICD-10-CM

## 2025-01-24 DIAGNOSIS — H25.13: ICD-10-CM

## 2025-01-24 DIAGNOSIS — H40.033: ICD-10-CM

## 2025-01-24 PROCEDURE — 99024 POSTOP FOLLOW-UP VISIT: CPT | Mod: 24 | Performed by: OPHTHALMOLOGY

## 2025-01-24 ASSESSMENT — KERATOMETRY
OS_K1POWER_DIOPTERS: 41.75
OD_K1POWER_DIOPTERS: 42.25
OD_K2POWER_DIOPTERS: 43.00
OS_K2POWER_DIOPTERS: 42.50
OD_AXISANGLE_DEGREES: 033
OS_AXISANGLE_DEGREES: 154

## 2025-01-24 ASSESSMENT — SUPERFICIAL PUNCTATE KERATITIS (SPK)
OD_SPK: T
OS_SPK: T

## 2025-01-24 ASSESSMENT — REFRACTION_AUTOREFRACTION
OS_SPHERE: UTP
OD_SPHERE: UTP

## 2025-01-24 ASSESSMENT — REFRACTION_CURRENTRX
OS_SPHERE: +1.75
OS_ADD: +2.50
OD_SPHERE: +1.75
OS_OVR_VA: 20/
OD_CYLINDER: SPH
OD_OVR_VA: 20/
OS_CYLINDER: SPH
OS_VPRISM_DIRECTION: PROGS
OD_VPRISM_DIRECTION: PROGS
OD_ADD: +2.50

## 2025-01-24 ASSESSMENT — TONOMETRY: OD_IOP_MMHG: 20

## 2025-01-24 ASSESSMENT — VISUAL ACUITY
OD_BCVA: CF @4 FT
OS_BCVA: 20/25-2

## 2025-04-03 NOTE — CURRENT MEDS
[Takes medication as prescribed] : takes [None] : Patient does not have any barriers to medication adherence [Yes] : Reviewed medication list for presence of high-risk medications. [Sedatives] : sedatives Left arm;

## 2025-04-16 ENCOUNTER — NON-APPOINTMENT (OUTPATIENT)
Age: 60
End: 2025-04-16

## 2025-04-18 ENCOUNTER — APPOINTMENT (OUTPATIENT)
Dept: FAMILY MEDICINE | Facility: CLINIC | Age: 60
End: 2025-04-18
Payer: COMMERCIAL

## 2025-04-18 VITALS
HEIGHT: 74 IN | RESPIRATION RATE: 12 BRPM | DIASTOLIC BLOOD PRESSURE: 80 MMHG | SYSTOLIC BLOOD PRESSURE: 110 MMHG | OXYGEN SATURATION: 100 % | TEMPERATURE: 97.8 F | WEIGHT: 165 LBS | BODY MASS INDEX: 21.17 KG/M2 | HEART RATE: 83 BPM

## 2025-04-18 DIAGNOSIS — Z13.29 ENCOUNTER FOR SCREENING FOR OTHER SUSPECTED ENDOCRINE DISORDER: ICD-10-CM

## 2025-04-18 DIAGNOSIS — Z13.220 ENCOUNTER FOR SCREENING FOR LIPOID DISORDERS: ICD-10-CM

## 2025-04-18 DIAGNOSIS — Z12.5 ENCOUNTER FOR SCREENING FOR MALIGNANT NEOPLASM OF PROSTATE: ICD-10-CM

## 2025-04-18 DIAGNOSIS — Z00.00 ENCOUNTER FOR GENERAL ADULT MEDICAL EXAMINATION W/OUT ABNORMAL FINDINGS: ICD-10-CM

## 2025-04-18 DIAGNOSIS — Z13.0 ENCOUNTER FOR SCREENING FOR DISEASES OF THE BLOOD AND BLOOD-FORMING ORGANS AND CERTAIN DISORDERS INVOLVING THE IMMUNE MECHANISM: ICD-10-CM

## 2025-04-18 PROCEDURE — 99396 PREV VISIT EST AGE 40-64: CPT

## 2025-05-23 ENCOUNTER — OFFICE (OUTPATIENT)
Dept: URBAN - METROPOLITAN AREA CLINIC 103 | Facility: CLINIC | Age: 60
Setting detail: OPHTHALMOLOGY
End: 2025-05-23
Payer: MEDICARE

## 2025-05-23 DIAGNOSIS — H25.13: ICD-10-CM

## 2025-05-23 DIAGNOSIS — H40.033: ICD-10-CM

## 2025-05-23 PROCEDURE — 92133 CPTRZD OPH DX IMG PST SGM ON: CPT | Performed by: OPHTHALMOLOGY

## 2025-05-23 PROCEDURE — 92012 INTRM OPH EXAM EST PATIENT: CPT | Performed by: OPHTHALMOLOGY

## 2025-05-23 ASSESSMENT — REFRACTION_CURRENTRX
OD_CYLINDER: SPH
OS_OVR_VA: 20/
OS_CYLINDER: SPH
OD_SPHERE: +1.75
OD_VPRISM_DIRECTION: PROGS
OD_OVR_VA: 20/
OS_SPHERE: +1.75
OS_VPRISM_DIRECTION: PROGS
OS_ADD: +2.50
OD_ADD: +2.50

## 2025-05-23 ASSESSMENT — REFRACTION_AUTOREFRACTION
OS_SPHERE: UTP
OD_SPHERE: UTP

## 2025-05-23 ASSESSMENT — SUPERFICIAL PUNCTATE KERATITIS (SPK)
OD_SPK: T
OS_SPK: T

## 2025-05-23 ASSESSMENT — VISUAL ACUITY
OS_BCVA: 20/30-1
OD_BCVA: 20/400

## 2025-05-23 ASSESSMENT — TONOMETRY
OS_IOP_MMHG: 18
OD_IOP_MMHG: 15

## 2025-05-23 ASSESSMENT — KERATOMETRY
OS_K1POWER_DIOPTERS: UTP
OD_K1POWER_DIOPTERS: 42.50
OD_K2POWER_DIOPTERS: 43.50
OD_AXISANGLE_DEGREES: 043

## 2025-05-23 ASSESSMENT — CONFRONTATIONAL VISUAL FIELD TEST (CVF)
OD_FINDINGS: FULL
OS_FINDINGS: FULL

## 2025-06-13 ENCOUNTER — OFFICE (OUTPATIENT)
Dept: URBAN - METROPOLITAN AREA CLINIC 103 | Facility: CLINIC | Age: 60
Setting detail: OPHTHALMOLOGY
End: 2025-06-13
Payer: MEDICARE

## 2025-06-13 DIAGNOSIS — E11.3313: ICD-10-CM

## 2025-06-13 PROCEDURE — 92134 CPTRZ OPH DX IMG PST SGM RTA: CPT | Performed by: OPHTHALMOLOGY

## 2025-06-13 PROCEDURE — 92012 INTRM OPH EXAM EST PATIENT: CPT | Performed by: OPHTHALMOLOGY

## 2025-06-13 ASSESSMENT — REFRACTION_AUTOREFRACTION
OS_SPHERE: UTP
OD_SPHERE: UTP

## 2025-06-13 ASSESSMENT — VISUAL ACUITY
OS_BCVA: 20/25-1
OD_BCVA: 20/400

## 2025-06-13 ASSESSMENT — TONOMETRY
OS_IOP_MMHG: 18
OD_IOP_MMHG: 15

## 2025-06-13 ASSESSMENT — CONFRONTATIONAL VISUAL FIELD TEST (CVF)
OD_FINDINGS: FULL
OS_FINDINGS: FULL

## 2025-06-13 ASSESSMENT — SUPERFICIAL PUNCTATE KERATITIS (SPK)
OS_SPK: T
OD_SPK: T

## 2025-06-13 ASSESSMENT — KERATOMETRY
OD_K1POWER_DIOPTERS: 42.50
OD_K2POWER_DIOPTERS: 43.50
OD_AXISANGLE_DEGREES: 043
OS_K1POWER_DIOPTERS: UTP

## 2025-07-16 ENCOUNTER — APPOINTMENT (OUTPATIENT)
Dept: FAMILY MEDICINE | Facility: CLINIC | Age: 60
End: 2025-07-16
Payer: MEDICARE

## 2025-07-16 VITALS
BODY MASS INDEX: 21.69 KG/M2 | DIASTOLIC BLOOD PRESSURE: 80 MMHG | SYSTOLIC BLOOD PRESSURE: 120 MMHG | HEART RATE: 81 BPM | TEMPERATURE: 98.3 F | HEIGHT: 74 IN | WEIGHT: 169 LBS | RESPIRATION RATE: 15 BRPM | OXYGEN SATURATION: 98 %

## 2025-07-16 PROBLEM — R42 VERTIGO: Status: ACTIVE | Noted: 2025-07-16

## 2025-07-16 LAB — HBA1C MFR BLD HPLC: 5.8

## 2025-07-16 PROCEDURE — G2211 COMPLEX E/M VISIT ADD ON: CPT

## 2025-07-16 PROCEDURE — 83036 HEMOGLOBIN GLYCOSYLATED A1C: CPT | Mod: QW

## 2025-07-16 PROCEDURE — 99204 OFFICE O/P NEW MOD 45 MIN: CPT

## 2025-08-01 ENCOUNTER — APPOINTMENT (OUTPATIENT)
Dept: FAMILY MEDICINE | Facility: CLINIC | Age: 60
End: 2025-08-01
Payer: MEDICARE

## 2025-08-01 VITALS
HEART RATE: 78 BPM | RESPIRATION RATE: 15 BRPM | WEIGHT: 168 LBS | SYSTOLIC BLOOD PRESSURE: 130 MMHG | HEIGHT: 74 IN | BODY MASS INDEX: 21.56 KG/M2 | DIASTOLIC BLOOD PRESSURE: 76 MMHG | OXYGEN SATURATION: 98 %

## 2025-08-01 DIAGNOSIS — Z01.818 ENCOUNTER FOR OTHER PREPROCEDURAL EXAMINATION: ICD-10-CM

## 2025-08-01 PROCEDURE — G2211 COMPLEX E/M VISIT ADD ON: CPT

## 2025-08-01 PROCEDURE — 99214 OFFICE O/P EST MOD 30 MIN: CPT
